# Patient Record
Sex: FEMALE | Race: BLACK OR AFRICAN AMERICAN | URBAN - METROPOLITAN AREA
[De-identification: names, ages, dates, MRNs, and addresses within clinical notes are randomized per-mention and may not be internally consistent; named-entity substitution may affect disease eponyms.]

---

## 2019-10-22 ENCOUNTER — OFFICE VISIT (OUTPATIENT)
Dept: FAMILY MEDICINE CLINIC | Age: 59
End: 2019-10-22

## 2019-10-22 VITALS
HEIGHT: 65 IN | HEART RATE: 98 BPM | BODY MASS INDEX: 23.99 KG/M2 | SYSTOLIC BLOOD PRESSURE: 112 MMHG | OXYGEN SATURATION: 96 % | RESPIRATION RATE: 20 BRPM | DIASTOLIC BLOOD PRESSURE: 71 MMHG | TEMPERATURE: 98.6 F | WEIGHT: 144 LBS

## 2019-10-22 DIAGNOSIS — Z13.6 SCREENING FOR CARDIOVASCULAR CONDITION: ICD-10-CM

## 2019-10-22 DIAGNOSIS — Z11.59 NEED FOR HEPATITIS C SCREENING TEST: ICD-10-CM

## 2019-10-22 DIAGNOSIS — E61.1 IRON DEFICIENCY: ICD-10-CM

## 2019-10-22 DIAGNOSIS — F25.9 SCHIZOAFFECTIVE DISORDER, UNSPECIFIED TYPE (HCC): ICD-10-CM

## 2019-10-22 DIAGNOSIS — J45.41 MODERATE PERSISTENT ASTHMATIC BRONCHITIS WITH ACUTE EXACERBATION: Primary | ICD-10-CM

## 2019-10-22 DIAGNOSIS — M25.562 CHRONIC PAIN OF BOTH KNEES: ICD-10-CM

## 2019-10-22 DIAGNOSIS — G89.29 CHRONIC PAIN OF BOTH KNEES: ICD-10-CM

## 2019-10-22 DIAGNOSIS — M25.561 CHRONIC PAIN OF BOTH KNEES: ICD-10-CM

## 2019-10-22 DIAGNOSIS — Z12.39 SCREENING FOR BREAST CANCER: ICD-10-CM

## 2019-10-22 RX ORDER — CYPROHEPTADINE HYDROCHLORIDE 4 MG/1
TABLET ORAL
COMMUNITY
End: 2022-01-27 | Stop reason: ALTCHOICE

## 2019-10-22 RX ORDER — PREDNISONE 10 MG/1
TABLET ORAL
Qty: 18 TAB | Refills: 0 | Status: SHIPPED | OUTPATIENT
Start: 2019-10-22 | End: 2019-11-20

## 2019-10-22 RX ORDER — GUAIFENESIN AND DEXTROMETHORPHAN HBR 20; 400 MG/1; MG/1
TABLET ORAL
COMMUNITY
End: 2019-10-22 | Stop reason: ALTCHOICE

## 2019-10-22 RX ORDER — PROMETHAZINE HYDROCHLORIDE AND DEXTROMETHORPHAN HYDROBROMIDE 6.25; 15 MG/5ML; MG/5ML
5 SYRUP ORAL
Qty: 240 ML | Refills: 0 | Status: SHIPPED | OUTPATIENT
Start: 2019-10-22 | End: 2019-10-29

## 2019-10-22 RX ORDER — TIMOLOL MALEATE 5 MG/ML
1 SOLUTION/ DROPS OPHTHALMIC 2 TIMES DAILY
COMMUNITY

## 2019-10-22 RX ORDER — RISPERIDONE 4 MG/1
TABLET ORAL
COMMUNITY

## 2019-10-22 RX ORDER — FERROUS SULFATE 324(65)MG
TABLET, DELAYED RELEASE (ENTERIC COATED) ORAL
COMMUNITY
End: 2019-10-23 | Stop reason: SDUPTHER

## 2019-10-22 RX ORDER — DEXTROMETHORPHAN HYDROBROMIDE, GUAIFENESIN 5; 100 MG/5ML; MG/5ML
650 LIQUID ORAL
Qty: 90 TAB | Refills: 0 | Status: SHIPPED | OUTPATIENT
Start: 2019-10-22 | End: 2019-11-12 | Stop reason: SDUPTHER

## 2019-10-22 RX ORDER — ALBUTEROL SULFATE 90 UG/1
2 AEROSOL, METERED RESPIRATORY (INHALATION)
Qty: 1 INHALER | Refills: 2 | Status: SHIPPED | OUTPATIENT
Start: 2019-10-22 | End: 2020-01-27

## 2019-10-22 NOTE — PROGRESS NOTES
HPI  Zafar Robles comes in to establish care. Psychotic disorder: seen by behavioral health specialist and is on risperdal. She has appointment for f/u next month. Will conitnue with current treatment plan. Iron deficiency: she is on iron replacement therapy, will check labs today. Glaucoma: seen by the ophthalmologist. Has h/o glaucoma. Cough: has been coughing for 2 weeks. Initially had fever but this has improved. Cough worse at night. Has wheeze. Cough is non productive. No hemoptysis. Denies chest pain. This is asthmatic bronchitis. Will give albuterol, prednisone and also give promethazine DM for cough. Knee pain: patient has bilateral knee pain due to osteoarthritis. Will give tylenol arthritis to take as needed. Past Medical History  Past Medical History:   Diagnosis Date    Anemia     Psychotic disorder [facility])        Surgical History  Past Surgical History:   Procedure Laterality Date    HX GYN      etopic pregnacy 1970\"s        Medications  Current Outpatient Medications   Medication Sig Dispense Refill    guaiFENesin-dextromethorphan (MUCUS RELIEF DM)  mg tab tablet Take  by mouth.  risperiDONE (RISPERDAL) 4 mg tablet Take  by mouth.  cyproheptadine (PERIACTIN) 4 mg tablet Take  by mouth three (3) times daily as needed.  ferrous sulfate 324 mg (65 mg iron) tablet Take  by mouth Daily (before breakfast).  timolol (TIMOPTIC) 0.5 % ophthalmic solution 1 Drop two (2) times a day.          Allergies  No Known Allergies    Family History  Family History   Problem Relation Age of Onset    Cancer Mother     Diabetes Mother     Hypertension Mother     Cancer Maternal Aunt     Diabetes Maternal Aunt     Hypertension Maternal Aunt     Cancer Maternal Grandmother     Diabetes Maternal Grandmother     Hypertension Maternal Grandmother        Social History  Social History     Socioeconomic History    Marital status: SINGLE     Spouse name: Not on file    Number of children: Not on file    Years of education: Not on file    Highest education level: Not on file   Occupational History    Not on file   Social Needs    Financial resource strain: Not on file    Food insecurity:     Worry: Not on file     Inability: Not on file    Transportation needs:     Medical: Not on file     Non-medical: Not on file   Tobacco Use    Smoking status: Current Every Day Smoker    Smokeless tobacco: Never Used   Substance and Sexual Activity    Alcohol use: Yes    Drug use: Not Currently    Sexual activity: Not Currently   Lifestyle    Physical activity:     Days per week: Not on file     Minutes per session: Not on file    Stress: Not on file   Relationships    Social connections:     Talks on phone: Not on file     Gets together: Not on file     Attends Sabianist service: Not on file     Active member of club or organization: Not on file     Attends meetings of clubs or organizations: Not on file     Relationship status: Not on file    Intimate partner violence:     Fear of current or ex partner: Not on file     Emotionally abused: Not on file     Physically abused: Not on file     Forced sexual activity: Not on file   Other Topics Concern    Not on file   Social History Narrative    Not on file       Review of Systems  Review of Systems - Review of all systems is negative except as noted above in the HPI.     Vital Signs  Visit Vitals  /71 (BP 1 Location: Left arm, BP Patient Position: Sitting)   Pulse 98   Temp 98.6 °F (37 °C) (Oral)   Resp 20   Ht 5' 5\" (1.651 m)   Wt 144 lb (65.3 kg)   SpO2 96%   BMI 23.96 kg/m²         Physical Exam  Physical Examination: General appearance - alert, well appearing, and in no distress, oriented to person, place, and time, normal appearing weight, acyanotic, in no respiratory distress and well hydrated  Mental status - alert, oriented to person, place, and time, affect appropriate to mood  Mouth - mucous membranes moist, pharynx normal without lesions  Neck - supple, no significant adenopathy  Lymphatics - no palpable lymphadenopathy, no hepatosplenomegaly  Chest - clear to auscultation, no wheezes, rales or rhonchi, symmetric air entry  Heart - normal rate, regular rhythm, normal S1, S2, no murmurs, rubs, clicks or gallops  Abdomen - soft, nontender, nondistended, no masses or organomegaly  Back exam - limited range of motion  Neurological - neck supple without rigidity, motor and sensory grossly normal bilaterally  Musculoskeletal -bilateral knee discomfort to palpation along the knee margins, limitation of flexion and extension due to the discomfort, no swelling or erythema. Extremities - intact peripheral pulses    Results  No results found for this or any previous visit. ASSESSMENT and PLAN    ICD-10-CM ICD-9-CM    1. Moderate persistent asthmatic bronchitis with acute exacerbation J45.41 493.92 albuterol (PROVENTIL HFA, VENTOLIN HFA, PROAIR HFA) 90 mcg/actuation inhaler      predniSONE (DELTASONE) 10 mg tablet      promethazine-dextromethorphan (PROMETHAZINE-DM) 6.25-15 mg/5 mL syrup      CBC WITH AUTOMATED DIFF      METABOLIC PANEL, COMPREHENSIVE      METABOLIC PANEL, COMPREHENSIVE      CBC WITH AUTOMATED DIFF   2. Screening for breast cancer Z12.39 V76.10 ALBER MAMMO BI SCREENING INCL CAD      LIPID PANEL      LIPID PANEL   3. Need for hepatitis C screening test Z11.59 V73.89 COLLECTION VENOUS BLOOD,VENIPUNCTURE   4. [facility] unspecified [facility] F25.9 295.70    5. Screening for cardiovascular condition Z13.6 V81.2 HEPATITIS C AB      HEPATITIS C AB      COLLECTION VENOUS BLOOD,VENIPUNCTURE   6. Iron deficiency E61.1 280.9 IRON PROFILE      FERRITIN      FERRITIN      IRON PROFILE      COLLECTION VENOUS BLOOD,VENIPUNCTURE   7.  Chronic pain of both knees M25.561 719.46 acetaminophen (TYLENOL ARTHRITIS PAIN) 650 mg TbER    M25.562 338.29     G89.29       lab results and schedule of future lab studies reviewed with patient  reviewed diet, exercise and weight control  cardiovascular risk and specific lipid/LDL goals reviewed  reviewed medications and side effects in detail    I have discussed the diagnosis with the patient and the intended plan of care as seen in the above orders. The patient has received an after-visit summary and questions were answered concerning future plans. I have discussed medication, side effects, and warnings with the patient in detail. The patient verbalized understanding and is in agreement with the plan of care. The patient will contact the office with any additional concerns. Mark Garcia MD    PLEASE NOTE:   This document has been produced using voice recognition software.  Unrecognized errors in transcription may be present

## 2019-10-23 LAB
A-G RATIO,AGRAT: 1.3 RATIO (ref 1.1–2.6)
ABSOLUTE LYMPHOCYTE COUNT, 10803: 2.9 K/UL (ref 1–4.8)
ALBUMIN SERPL-MCNC: 4.3 G/DL (ref 3.5–5)
ALP SERPL-CCNC: 105 U/L (ref 25–115)
ALT SERPL-CCNC: 15 U/L (ref 5–40)
ANION GAP SERPL CALC-SCNC: 17 MMOL/L
AST SERPL W P-5'-P-CCNC: 20 U/L (ref 10–37)
BASOPHILS # BLD: 0 K/UL (ref 0–0.2)
BASOPHILS NFR BLD: 0 % (ref 0–2)
BILIRUB SERPL-MCNC: 0.1 MG/DL (ref 0.2–1.2)
BUN SERPL-MCNC: 9 MG/DL (ref 6–22)
CALCIUM SERPL-MCNC: 9.7 MG/DL (ref 8.4–10.5)
CHLORIDE SERPL-SCNC: 102 MMOL/L (ref 98–110)
CHOLEST SERPL-MCNC: 189 MG/DL (ref 110–200)
CO2 SERPL-SCNC: 22 MMOL/L (ref 20–32)
CREAT SERPL-MCNC: 0.5 MG/DL (ref 0.5–1.2)
EOSINOPHIL # BLD: 0.2 K/UL (ref 0–0.5)
EOSINOPHIL NFR BLD: 2 % (ref 0–6)
ERYTHROCYTE [DISTWIDTH] IN BLOOD BY AUTOMATED COUNT: 15.8 % (ref 10–15.5)
FE % SATURATION,PSAT: 10 % (ref 20–50)
FERRITIN SERPL-MCNC: 43 NG/ML (ref 10–291)
GFRAA, 66117: >60
GFRNA, 66118: >60
GLOBULIN,GLOB: 3.2 G/DL (ref 2–4)
GLUCOSE SERPL-MCNC: 81 MG/DL (ref 70–99)
GRANULOCYTES,GRANS: 59 % (ref 40–75)
HCT VFR BLD AUTO: 41 % (ref 35.1–48)
HCV AB SER IA-ACNC: NORMAL
HDLC SERPL-MCNC: 2.9 MG/DL (ref 0–5)
HDLC SERPL-MCNC: 65 MG/DL (ref 40–59)
HGB BLD-MCNC: 12.8 G/DL (ref 11.7–16)
IRON,IRN: 27 MCG/DL (ref 30–160)
LDLC SERPL CALC-MCNC: 95 MG/DL (ref 50–99)
LYMPHOCYTES, LYMLT: 27 % (ref 20–45)
MCH RBC QN AUTO: 30 PG (ref 26–34)
MCHC RBC AUTO-ENTMCNC: 31 G/DL (ref 31–36)
MCV RBC AUTO: 97 FL (ref 80–95)
MONOCYTES # BLD: 1.2 K/UL (ref 0.1–1)
MONOCYTES NFR BLD: 12 % (ref 3–12)
NEUTROPHILS # BLD AUTO: 6.2 K/UL (ref 1.8–7.7)
PLATELET # BLD AUTO: 256 K/UL (ref 140–440)
PMV BLD AUTO: 11.6 FL (ref 9–13)
POTASSIUM SERPL-SCNC: 4.5 MMOL/L (ref 3.5–5.5)
PROT SERPL-MCNC: 7.5 G/DL (ref 6.4–8.3)
RBC # BLD AUTO: 4.23 M/UL (ref 3.8–5.2)
SODIUM SERPL-SCNC: 141 MMOL/L (ref 133–145)
TIBC,TIBC: 268 MCG/DL (ref 228–428)
TRIGL SERPL-MCNC: 146 MG/DL (ref 40–149)
UIBC SERPL-MCNC: 241 MCG/DL (ref 110–370)
VLDLC SERPL CALC-MCNC: 29 MG/DL (ref 8–30)
WBC # BLD AUTO: 10.5 K/UL (ref 4–11)

## 2019-10-23 RX ORDER — FERROUS SULFATE 324(65)MG
324 TABLET, DELAYED RELEASE (ENTERIC COATED) ORAL
Qty: 180 TAB | Refills: 1 | Status: SHIPPED | OUTPATIENT
Start: 2019-10-23 | End: 2019-11-20 | Stop reason: SDUPTHER

## 2019-10-23 NOTE — PROGRESS NOTES
Please let patient know her iron level is low. I will recommend that she take the ferrous sulfate 1 pill 2 times a day. I will send in a prescription to reflect that she is taking medication 2 times a day. Rest of her labs are reassuring.   Nicci Rosario MD

## 2019-10-24 NOTE — PROGRESS NOTES
Called patient at this time. No answer noted. Unable to leave a voicemail at this time. Voicemail not set up at this time

## 2019-11-12 DIAGNOSIS — M25.562 CHRONIC PAIN OF BOTH KNEES: ICD-10-CM

## 2019-11-12 DIAGNOSIS — G89.29 CHRONIC PAIN OF BOTH KNEES: ICD-10-CM

## 2019-11-12 DIAGNOSIS — M25.561 CHRONIC PAIN OF BOTH KNEES: ICD-10-CM

## 2019-11-13 RX ORDER — GUAIFENESIN 100 MG/5ML
LIQUID (ML) ORAL
Qty: 90 TAB | Refills: 0 | Status: SHIPPED | OUTPATIENT
Start: 2019-11-13 | End: 2020-05-21 | Stop reason: SDUPTHER

## 2019-11-20 ENCOUNTER — OFFICE VISIT (OUTPATIENT)
Dept: FAMILY MEDICINE CLINIC | Age: 59
End: 2019-11-20

## 2019-11-20 VITALS
WEIGHT: 146 LBS | BODY MASS INDEX: 24.32 KG/M2 | HEART RATE: 83 BPM | DIASTOLIC BLOOD PRESSURE: 61 MMHG | OXYGEN SATURATION: 97 % | RESPIRATION RATE: 16 BRPM | TEMPERATURE: 98.3 F | SYSTOLIC BLOOD PRESSURE: 107 MMHG | HEIGHT: 65 IN

## 2019-11-20 DIAGNOSIS — E61.1 IRON DEFICIENCY: ICD-10-CM

## 2019-11-20 DIAGNOSIS — M25.50 ARTHRALGIA, UNSPECIFIED JOINT: Primary | ICD-10-CM

## 2019-11-20 RX ORDER — IBUPROFEN 600 MG/1
600 TABLET ORAL
Qty: 60 TAB | Refills: 0 | Status: SHIPPED | OUTPATIENT
Start: 2019-11-20 | End: 2020-07-22

## 2019-11-20 RX ORDER — FERROUS SULFATE 324(65)MG
324 TABLET, DELAYED RELEASE (ENTERIC COATED) ORAL
Qty: 180 TAB | Refills: 1 | Status: SHIPPED | OUTPATIENT
Start: 2019-11-20 | End: 2021-03-15

## 2019-11-20 NOTE — PROGRESS NOTES
Chief Complaint   Patient presents with    Follow-up    Leg Pain     pain in the back of knee request pain medication      1. Have you been to the ER, urgent care clinic since your last visit? Hospitalized since your last visit? No    2. Have you seen or consulted any other health care providers outside of the [address] [place] [address] since your last visit? Include any pap smears or colon screening. No     HPI  Yonatan Chakraborty comes in for f/u care. Patient has a history of iron deficiency. We did check her iron levels last time she was seen and this was slightly low. I will have her take iron tablets. Recheck labs in 3 months. She has arthralgia with left leg and left elbow pain. Pain comes on and off. It comes with activity, lifting or walking. Patient has taken Tylenol arthritis. She wonders whether she can be given Tylenol with codeine. Discussed medication management for arthralgia. Will send in ibuprofen 600 mg to take up to every 8 hours as needed for the pain. I will follow-up in case of no improvement or worsening symptoms. Patient had a colonoscopy done. We will request the report. She will schedule visit to come in for Pap smear. Past Medical History  Past Medical History:   Diagnosis Date    Anemia     Psychotic disorder ([facility]        Surgical History  Past Surgical History:   Procedure Laterality Date    HX GYN      etopic pregnacy 1970\"s        Medications  Current Outpatient Medications   Medication Sig Dispense Refill    ARTHRITIS PAIN RELIEF 650 mg TbER take 1 tablet by mouth three times a day if needed for pain 90 Tab 0    risperiDONE (RISPERDAL) 4 mg tablet Take  by mouth.  cyproheptadine (PERIACTIN) 4 mg tablet Take  by mouth three (3) times daily as needed.  timolol (TIMOPTIC) 0.5 % ophthalmic solution 1 Drop two (2) times a day.       albuterol (PROVENTIL HFA, VENTOLIN HFA, PROAIR HFA) 90 mcg/actuation inhaler Take 2 Puffs by inhalation every six (6) hours as needed for Wheezing. 1 Inhaler 2    ferrous sulfate 324 mg (65 mg iron) tablet Take 1 Tab by mouth Daily (before breakfast). 180 Tab 1       Allergies  No Known Allergies    Family History  Family History   Problem Relation Age of Onset    Cancer Mother     Diabetes Mother     Hypertension Mother     Cancer Maternal Aunt     Diabetes Maternal Aunt     Hypertension Maternal Aunt     Cancer Maternal Grandmother     Diabetes Maternal Grandmother     Hypertension Maternal Grandmother        Social History  Social History     Socioeconomic History    Marital status: SINGLE     Spouse name: Not on file    Number of children: Not on file    Years of education: Not on file    Highest education level: Not on file   Occupational History    Not on file   Social Needs    Financial resource strain: Not on file    Food insecurity:     Worry: Not on file     Inability: Not on file    Transportation needs:     Medical: Not on file     Non-medical: Not on file   Tobacco Use    Smoking status: Current Every Day Smoker    Smokeless tobacco: Never Used   Substance and Sexual Activity    Alcohol use:  Yes    Drug use: Not Currently    Sexual activity: Not Currently   Lifestyle    Physical activity:     Days per week: Not on file     Minutes per session: Not on file    Stress: Not on file   Relationships    Social connections:     Talks on phone: Not on file     Gets together: Not on file     Attends Protestant service: Not on file     Active member of club or organization: Not on file     Attends meetings of clubs or organizations: Not on file     Relationship status: Not on file    Intimate partner violence:     Fear of current or ex partner: Not on file     Emotionally abused: Not on file     Physically abused: Not on file     Forced sexual activity: Not on file   Other Topics Concern    Not on file   Social History Narrative    Not on file       Review of Systems  Review of Systems - Review of all systems is negative except as noted above in the HPI. Vital Signs  Visit Vitals  /61 (BP 1 Location: Left arm, BP Patient Position: Sitting)   Pulse 83   Temp 98.3 °F (36.8 °C) (Oral)   Resp 16   Ht 5' 5\" (1.651 m)   Wt 146 lb (66.2 kg)   SpO2 97%   BMI 24.30 kg/m²         Physical Exam  Physical Examination: General appearance - acyanotic, in no respiratory distress  Mental status - affect appropriate to mood  Lymphatics - no palpable lymphadenopathy  Chest - no tachypnea, retractions or cyanosis  Heart - S1 and S2 normal  Back exam - limited range of motion  Neurological - abnormal neurological exam unchanged from prior examinations  Musculoskeletal - osteoarthritic changes noted in both hands  Extremities - intact peripheral pulses    Results  Results for orders placed or performed in visit on 10/22/19   FERRITIN   Result Value Ref Range    Ferritin 43 10 - 291 ng/mL   IRON PROFILE   Result Value Ref Range    Iron 27 (L) 30 - 160 mcg/dL    UIBC 241 110 - 370 mcg/dL    TIBC 268 228 - 428 mcg/dL    Iron % saturation 10 (L) 20 - 50 %   HEPATITIS C AB   Result Value Ref Range    Hep C Virus Ab None Detected None Detec   LIPID PANEL   Result Value Ref Range    Triglyceride 146 40 - 149 mg/dL    HDL Cholesterol 65 (H) 40 - 59 mg/dL    Cholesterol, total 189 110 - 200 mg/dL    CHOLESTEROL/HDL 2.9 0.0 - 5.0    LDL, calculated 95 50 - 99 mg/dL    VLDL, calculated 29 8 - 30 mg/dL   METABOLIC PANEL, COMPREHENSIVE   Result Value Ref Range    Glucose 81 70 - 99 mg/dL    BUN 9 6 - 22 mg/dL    Creatinine 0.5 0.5 - 1.2 mg/dL    Sodium 141 133 - 145 mmol/L    Potassium 4.5 3.5 - 5.5 mmol/L    Chloride 102 98 - 110 mmol/L    CO2 22 20 - 32 mmol/L    AST (SGOT) 20 10 - 37 U/L    ALT (SGPT) 15 5 - 40 U/L    Alk.  phosphatase 105 25 - 115 U/L    Bilirubin, total 0.1 (L) 0.2 - 1.2 mg/dL    Calcium 9.7 8.4 - 10.5 mg/dL    Protein, total 7.5 6.4 - 8.3 g/dL    Albumin 4.3 3.5 - 5.0 g/dL    A-G Ratio 1.3 1.1 - 2.6 ratio    Globulin 3.2 2.0 - 4.0 g/dL    Anion gap 17.0 mmol/L    GFRAA >60.0 >60.0    GFRNA >60.0 >60.0   CBC WITH AUTOMATED DIFF   Result Value Ref Range    WBC 10.5 4.0 - 11.0 K/uL    RBC 4.23 3.80 - 5.20 M/uL    HGB 12.8 11.7 - 16.0 g/dL    HCT 41.0 35.1 - 48.0 %    MCV 97 (H) 80 - 95 fL    MCH 30 26 - 34 pg    MCHC 31 31 - 36 g/dL    RDW 15.8 (H) 10.0 - 15.5 %    PLATELET 673 655 - 458 K/uL    MPV 11.6 9.0 - 13.0 fL    NEUTROPHILS 59 40 - 75 %    Lymphocytes 27 20 - 45 %    MONOCYTES 12 3 - 12 %    EOSINOPHILS 2 0 - 6 %    BASOPHILS 0 0 - 2 %    ABS. NEUTROPHILS 6.2 1.8 - 7.7 K/uL    ABSOLUTE LYMPHOCYTE COUNT 2.9 1.0 - 4.8 K/uL    ABS. MONOCYTES 1.2 (H) 0.1 - 1.0 K/uL    ABS. EOSINOPHILS 0.2 0.0 - 0.5 K/uL    ABS. BASOPHILS 0.0 0.0 - 0.2 K/uL       ASSESSMENT and PLAN    ICD-10-CM ICD-9-CM    1. Arthralgia, unspecified joint M25.50 719.40 ibuprofen (MOTRIN) 600 mg tablet   2. Iron deficiency E61.1 280.9      lab results and schedule of future lab studies reviewed with patient  reviewed diet, exercise and weight control  reviewed medications and side effects in detail    I have discussed the diagnosis with the patient and the intended plan of care as seen in the above orders. The patient has received an after-visit summary and questions were answered concerning future plans. I have discussed medication, side effects, and warnings with the patient in detail. The patient verbalized understanding and is in agreement with the plan of care. The patient will contact the office with any additional concerns. Vijaya Velazquez MD    PLEASE NOTE:   This document has been produced using voice recognition software.  Unrecognized errors in transcription may be present

## 2019-12-13 DIAGNOSIS — Z12.39 SCREENING FOR BREAST CANCER: ICD-10-CM

## 2020-01-22 ENCOUNTER — OFFICE VISIT (OUTPATIENT)
Dept: FAMILY MEDICINE CLINIC | Age: 60
End: 2020-01-22

## 2020-01-22 VITALS
WEIGHT: 146 LBS | RESPIRATION RATE: 16 BRPM | TEMPERATURE: 96.3 F | HEIGHT: 65 IN | OXYGEN SATURATION: 97 % | HEART RATE: 90 BPM | DIASTOLIC BLOOD PRESSURE: 69 MMHG | BODY MASS INDEX: 24.32 KG/M2 | SYSTOLIC BLOOD PRESSURE: 138 MMHG

## 2020-01-22 DIAGNOSIS — Z12.11 SCREEN FOR COLON CANCER: ICD-10-CM

## 2020-01-22 DIAGNOSIS — E61.1 IRON DEFICIENCY: ICD-10-CM

## 2020-01-22 DIAGNOSIS — R05.9 COUGH: ICD-10-CM

## 2020-01-22 DIAGNOSIS — Z12.4 SCREENING FOR MALIGNANT NEOPLASM OF CERVIX: Primary | ICD-10-CM

## 2020-01-22 RX ORDER — GUAIFENESIN DEXTROMETHORPHAN HYDROBROMIDE ORAL SOLUTION 10; 100 MG/5ML; MG/5ML
10 SOLUTION ORAL
Qty: 236 ML | Refills: 0 | Status: SHIPPED | OUTPATIENT
Start: 2020-01-22 | End: 2020-05-21 | Stop reason: SDUPTHER

## 2020-01-22 NOTE — PROGRESS NOTES
Chief Complaint   Patient presents with    Well Woman     1. Have you been to the ER, urgent care clinic since your last visit? Hospitalized since your last visit? No    2. Have you seen or consulted any other health care providers outside of the [address] [place] [address] since your last visit? Include any pap smears or colon screening. No     HPI  Denia Barksdale comes in accompanied by her caregiver. She is due to have Pap smear done today. Cough: Patient has an occasional cough on and off. It has been ongoing for about a week. No fever, chills, shortness of breath, wheeze or chest pain. She would like to try some cough medication. I will send in [facility]. I will follow-up in case of no improvement worsening symptoms. Past Medical History  Past Medical History:   Diagnosis Date    Anemia     Psychotic disorder [facility])        Surgical History  Past Surgical History:   Procedure Laterality Date    HX GYN      etopic pregnacy 1970\"s        Medications  Current Outpatient Medications   Medication Sig Dispense Refill    ferrous sulfate 324 mg (65 mg iron) tablet Take 1 Tab by mouth Daily (before breakfast). 180 Tab 1    ibuprofen (MOTRIN) 600 mg tablet Take 1 Tab by mouth every eight (8) hours as needed for Pain. 60 Tab 0    ARTHRITIS PAIN RELIEF 650 mg TbER take 1 tablet by mouth three times a day if needed for pain 90 Tab 0    risperiDONE (RISPERDAL) 4 mg tablet Take  by mouth.  cyproheptadine (PERIACTIN) 4 mg tablet Take  by mouth three (3) times daily as needed.  timolol (TIMOPTIC) 0.5 % ophthalmic solution 1 Drop two (2) times a day.  albuterol (PROVENTIL HFA, VENTOLIN HFA, PROAIR HFA) 90 mcg/actuation inhaler Take 2 Puffs by inhalation every six (6) hours as needed for Wheezing.  1 Inhaler 2       Allergies  No Known Allergies    Family History  Family History   Problem Relation Age of Onset    Cancer Mother     Diabetes Mother     Hypertension Mother     Cancer Maternal Aunt     Diabetes Maternal Aunt     Hypertension Maternal Aunt     Cancer Maternal Grandmother     Diabetes Maternal Grandmother     Hypertension Maternal Grandmother        Social History  Social History     Socioeconomic History    Marital status: SINGLE     Spouse name: Not on file    Number of children: Not on file    Years of education: Not on file    Highest education level: Not on file   Occupational History    Not on file   Social Needs    Financial resource strain: Not on file    Food insecurity:     Worry: Not on file     Inability: Not on file    Transportation needs:     Medical: Not on file     Non-medical: Not on file   Tobacco Use    Smoking status: Current Every Day Smoker    Smokeless tobacco: Never Used   Substance and Sexual Activity    Alcohol use: Yes    Drug use: Not Currently    Sexual activity: Not Currently   Lifestyle    Physical activity:     Days per week: Not on file     Minutes per session: Not on file    Stress: Not on file   Relationships    Social connections:     Talks on phone: Not on file     Gets together: Not on file     Attends Bahai service: Not on file     Active member of club or organization: Not on file     Attends meetings of clubs or organizations: Not on file     Relationship status: Not on file    Intimate partner violence:     Fear of current or ex partner: Not on file     Emotionally abused: Not on file     Physically abused: Not on file     Forced sexual activity: Not on file   Other Topics Concern    Not on file   Social History Narrative    Not on file       Review of Systems  Review of Systems - Review of all systems is negative except as noted above in the HPI.     Vital Signs  Visit Vitals  /69 (BP 1 Location: Left arm, BP Patient Position: Sitting)   Pulse 90   Temp 96.3 °F (35.7 °C) (Oral)   Resp 16   Ht 5' 5\" (1.651 m)   Wt 146 lb (66.2 kg)   SpO2 97%   BMI 24.30 kg/m²         Physical Exam  Physical Examination: General appearance - alert, well appearing, and in no distress, oriented to person, place, and time and acyanotic, in no respiratory distress  Mental status - alert, oriented to person, place, and time, affect appropriate to mood  Neck - supple, no significant adenopathy  Lymphatics - no palpable lymphadenopathy  Chest - clear to auscultation, no wheezes, rales or rhonchi, symmetric air entry  Heart - normal rate and regular rhythm, S1 and S2 normal  Abdomen - soft, nontender, nondistended, no masses or organomegaly  Pelvic - normal external genitalia, vulva, vagina, cervix, uterus and adnexa, PAP: Pap smear done today, exam chaperoned by Sandra Lugo LPN. Neurological - alert, oriented, normal speech, no focal findings or movement disorder noted  Musculoskeletal - osteoarthritic changes noted in both hands  Extremities - intact peripheral pulses    Results  Results for orders placed or performed in visit on 10/22/19   FERRITIN   Result Value Ref Range    Ferritin 43 10 - 291 ng/mL   IRON PROFILE   Result Value Ref Range    Iron 27 (L) 30 - 160 mcg/dL    UIBC 241 110 - 370 mcg/dL    TIBC 268 228 - 428 mcg/dL    Iron % saturation 10 (L) 20 - 50 %   HEPATITIS C AB   Result Value Ref Range    Hep C Virus Ab None Detected None Detec   LIPID PANEL   Result Value Ref Range    Triglyceride 146 40 - 149 mg/dL    HDL Cholesterol 65 (H) 40 - 59 mg/dL    Cholesterol, total 189 110 - 200 mg/dL    CHOLESTEROL/HDL 2.9 0.0 - 5.0    LDL, calculated 95 50 - 99 mg/dL    VLDL, calculated 29 8 - 30 mg/dL   METABOLIC PANEL, COMPREHENSIVE   Result Value Ref Range    Glucose 81 70 - 99 mg/dL    BUN 9 6 - 22 mg/dL    Creatinine 0.5 0.5 - 1.2 mg/dL    Sodium 141 133 - 145 mmol/L    Potassium 4.5 3.5 - 5.5 mmol/L    Chloride 102 98 - 110 mmol/L    CO2 22 20 - 32 mmol/L    AST (SGOT) 20 10 - 37 U/L    ALT (SGPT) 15 5 - 40 U/L    Alk.  phosphatase 105 25 - 115 U/L    Bilirubin, total 0.1 (L) 0.2 - 1.2 mg/dL    Calcium 9.7 8.4 - 10.5 mg/dL    Protein, total 7.5 6.4 - 8.3 g/dL    Albumin 4.3 3.5 - 5.0 g/dL    A-G Ratio 1.3 1.1 - 2.6 ratio    Globulin 3.2 2.0 - 4.0 g/dL    Anion gap 17.0 mmol/L    GFRAA >60.0 >60.0    GFRNA >60.0 >60.0   CBC WITH AUTOMATED DIFF   Result Value Ref Range    WBC 10.5 4.0 - 11.0 K/uL    RBC 4.23 3.80 - 5.20 M/uL    HGB 12.8 11.7 - 16.0 g/dL    HCT 41.0 35.1 - 48.0 %    MCV 97 (H) 80 - 95 fL    MCH 30 26 - 34 pg    MCHC 31 31 - 36 g/dL    RDW 15.8 (H) 10.0 - 15.5 %    PLATELET 298 932 - 743 K/uL    MPV 11.6 9.0 - 13.0 fL    NEUTROPHILS 59 40 - 75 %    Lymphocytes 27 20 - 45 %    MONOCYTES 12 3 - 12 %    EOSINOPHILS 2 0 - 6 %    BASOPHILS 0 0 - 2 %    ABS. NEUTROPHILS 6.2 1.8 - 7.7 K/uL    ABSOLUTE LYMPHOCYTE COUNT 2.9 1.0 - 4.8 K/uL    ABS. MONOCYTES 1.2 (H) 0.1 - 1.0 K/uL    ABS. EOSINOPHILS 0.2 0.0 - 0.5 K/uL    ABS. BASOPHILS 0.0 0.0 - 0.2 K/uL       ASSESSMENT and PLAN    ICD-10-CM ICD-9-CM    1. Screening for malignant neoplasm of cervix Z12.4 V76.2 PAP IG, CT-NG-TV, APTIMA HPV AND RFX 97/90,56([id number])      PAP IG, CT-NG-TV, APTIMA HPV AND RFX 60/53,30([id number])      COLLECTION VENOUS BLOOD,VENIPUNCTURE   2. Cough R05 786.2 guaiFENesin-dextromethorphan (GUAIFENESIN-DM)  mg/5 mL liqd   3. Screen for colon cancer Z12.11 V76.51 OCCULT BLOOD IMMUNOASSAY,DIAGNOSTIC   4. Iron deficiency E61.1 280.9 IRON PROFILE      FERRITIN      CBC W/O DIFF      CBC W/O DIFF      FERRITIN      IRON PROFILE      COLLECTION VENOUS BLOOD,VENIPUNCTURE     lab results and schedule of future lab studies reviewed with patient  reviewed diet, exercise and weight control  cardiovascular risk and specific lipid/LDL goals reviewed  reviewed medications and side effects in detail      I have discussed the diagnosis with the patient and the intended plan of care as seen in the above orders. The patient has received an after-visit summary and questions were answered concerning future plans.  I have discussed medication, side effects, and warnings with the patient in detail. The patient verbalized understanding and is in agreement with the plan of care. The patient will contact the office with any additional concerns. Bard Nageotte, MD    PLEASE NOTE:   This document has been produced using voice recognition software.  Unrecognized errors in transcription may be present

## 2020-01-23 DIAGNOSIS — J45.41 MODERATE PERSISTENT ASTHMATIC BRONCHITIS WITH ACUTE EXACERBATION: ICD-10-CM

## 2020-01-23 LAB
ERYTHROCYTE [DISTWIDTH] IN BLOOD BY AUTOMATED COUNT: 16.6 % (ref 10–15.5)
FE % SATURATION,PSAT: 14 % (ref 20–50)
FERRITIN SERPL-MCNC: 28 NG/ML (ref 10–291)
HCT VFR BLD AUTO: 40 % (ref 35.1–48)
HGB BLD-MCNC: 12.5 G/DL (ref 11.7–16)
IRON,IRN: 47 MCG/DL (ref 30–160)
MCH RBC QN AUTO: 31 PG (ref 26–34)
MCHC RBC AUTO-ENTMCNC: 31 G/DL (ref 31–36)
MCV RBC AUTO: 98 FL (ref 81–99)
PLATELET # BLD AUTO: 260 K/UL (ref 140–440)
PMV BLD AUTO: 11.6 FL (ref 9–13)
RBC # BLD AUTO: 4.1 M/UL (ref 3.8–5.2)
TIBC,TIBC: 339 MCG/DL (ref 228–428)
UIBC SERPL-MCNC: 292 MCG/DL (ref 110–370)
WBC # BLD AUTO: 7.7 K/UL (ref 4–11)

## 2020-01-24 LAB
CHLAMYDIA TRACHOMATIS THINPREP, 13342: NEGATIVE
HPV H RFLX, 13184: NEGATIVE
NEISSERIA GONORRHOEAE THINPREP, 13343: NEGATIVE
PAP IMAGE GUIDED, 8900296: NORMAL
TRICHOMONAS NUC AMP-THIN PREP,13357: POSITIVE

## 2020-01-27 RX ORDER — ALBUTEROL SULFATE 90 UG/1
AEROSOL, METERED RESPIRATORY (INHALATION)
Qty: 8.5 G | Refills: 1 | Status: SHIPPED | OUTPATIENT
Start: 2020-01-27 | End: 2020-05-21 | Stop reason: SDUPTHER

## 2020-02-06 RX ORDER — METRONIDAZOLE 500 MG/1
500 TABLET ORAL 3 TIMES DAILY
Qty: 30 TAB | Refills: 0 | Status: SHIPPED | OUTPATIENT
Start: 2020-02-06 | End: 2020-02-16

## 2020-02-06 NOTE — PROGRESS NOTES
Patient was advised of pap smear and Trichomonas test results, and that a prescription had been sent to the pharmacy.   Patient verbalized understanding

## 2020-02-06 NOTE — PROGRESS NOTES
Please let patient know the pap smear test result is negative for malignancy and recheck in 3 - 5 years. Trichomonas test is positive. This needs to be treated. I will send in metronidazole to take 3 x daily.   Kiet Mckay MD

## 2020-05-21 ENCOUNTER — VIRTUAL VISIT (OUTPATIENT)
Dept: FAMILY MEDICINE CLINIC | Age: 60
End: 2020-05-21

## 2020-05-21 DIAGNOSIS — M25.561 CHRONIC PAIN OF BOTH KNEES: ICD-10-CM

## 2020-05-21 DIAGNOSIS — R05.9 COUGH: Primary | ICD-10-CM

## 2020-05-21 DIAGNOSIS — J45.41 MODERATE PERSISTENT ASTHMATIC BRONCHITIS WITH ACUTE EXACERBATION: ICD-10-CM

## 2020-05-21 DIAGNOSIS — M25.562 CHRONIC PAIN OF BOTH KNEES: ICD-10-CM

## 2020-05-21 DIAGNOSIS — G89.29 CHRONIC PAIN OF BOTH KNEES: ICD-10-CM

## 2020-05-21 RX ORDER — DEXTROMETHORPHAN HYDROBROMIDE, GUAIFENESIN 5; 100 MG/5ML; MG/5ML
650 LIQUID ORAL
Qty: 90 TAB | Refills: 0 | Status: SHIPPED | OUTPATIENT
Start: 2020-05-21 | End: 2021-05-26 | Stop reason: SDUPTHER

## 2020-05-21 RX ORDER — ALBUTEROL SULFATE 90 UG/1
AEROSOL, METERED RESPIRATORY (INHALATION)
Qty: 8.5 G | Refills: 1 | Status: SHIPPED | OUTPATIENT
Start: 2020-05-21 | End: 2020-07-22 | Stop reason: SDUPTHER

## 2020-05-21 RX ORDER — GUAIFENESIN DEXTROMETHORPHAN HYDROBROMIDE ORAL SOLUTION 10; 100 MG/5ML; MG/5ML
10 SOLUTION ORAL
Qty: 236 ML | Refills: 0 | Status: SHIPPED | OUTPATIENT
Start: 2020-05-21 | End: 2020-07-22

## 2020-05-21 NOTE — PROGRESS NOTES
Teddy Ordonez is a 60 y.o. female evaluated via audio only technology on 5/21/2020. Consent: She and/or her health care decision maker is aware that she may receive a bill for this audio only encounter, depending on her insurance coverage, and has provided verbal consent to proceed: Yes    I communicated with the patient and/or health care decision maker about the nature and details of the following:  Assessment & Plan:       ICD-10-CM ICD-9-CM    1. Cough R05 786.2 guaiFENesin-dextromethorphan (guaiFENesin-DM)  mg/5 mL liqd   2. Moderate persistent asthmatic bronchitis with acute exacerbation J45.41 493.92 albuterol (ProAir HFA) 90 mcg/actuation inhaler   3. Chronic pain of both knees M25.561 719.46 acetaminophen (Arthritis Pain Relief) 650 mg TbER    M25.562 338.29     G89.29     12  Subjective:   Teddy Ordonez is a 60 y.o. female who was seen for Knee Pain and Cough    Patient has a history of iron deficiency. She is on iron replacement therapy. We did labs previously and her ferritin and iron levels were stable. We will continue with the current management plan. Patient has a cough that comes on and off. It is a dry irritating cough. No wheeze or shortness of breath. She does have albuterol inhaler to use as needed for wheezing. She denies hemoptysis or chest pain. No fever, chills, night sweats. She would like some cough medication sent into the pharmacy. I will send in guaifenesin DM. Patient has arthritis. She gets joint aches lower extremities. This affects the knees and the feet. Has used Tylenol arthritis in the past with good result. I will send in a refill of medication. Patient has bipolar disorder and is on Risperdal.  She will continue with this medication. I will follow-up with her in the clinic at next visit. Prior to Admission medications    Medication Sig Start Date End Date Taking?  Authorizing Provider   PROAIR HFA 90 mcg/actuation inhaler inhale 2 puffs by mouth every 6 hours if needed for wheezing 1/27/20   Alexis Beltran MD   guaiFENesin-dextromethorphan (GUAIFENESIN-DM)  mg/5 mL liqd Take 10 mL by mouth every six (6) hours as needed for Cough. 1/22/20   Alexis Beltran MD   ferrous sulfate 324 mg (65 mg iron) tablet Take 1 Tab by mouth Daily (before breakfast). 11/20/19   Alexis Beltran MD   ibuprofen (MOTRIN) 600 mg tablet Take 1 Tab by mouth every eight (8) hours as needed for Pain. 11/20/19   Alexis Beltran MD   ARTHRITIS PAIN RELIEF 650 mg TbER take 1 tablet by mouth three times a day if needed for pain 11/13/19   Alexis Beltran MD   risperiDONE (RISPERDAL) 4 mg tablet Take  by mouth. Provider, Historical   cyproheptadine (PERIACTIN) 4 mg tablet Take  by mouth three (3) times daily as needed. Provider, Historical   timolol (TIMOPTIC) 0.5 % ophthalmic solution 1 Drop two (2) times a day. Provider, Historical     No Known Allergies    ROS Review of all systems is negative except as noted above in the HPI. I affirm this is a Patient-Initiated Episode with a Patient who has not had a related appointment within my department in the past 7 days or scheduled within the next 24 hours.     Total Time: minutes: 11-20 minutes    Note: not billable if this call serves to triage the patient into an appointment for the relevant concern      Gino Humphrey MD

## 2020-07-22 ENCOUNTER — OFFICE VISIT (OUTPATIENT)
Dept: FAMILY MEDICINE CLINIC | Age: 60
End: 2020-07-22

## 2020-07-22 VITALS
WEIGHT: 131 LBS | RESPIRATION RATE: 20 BRPM | HEART RATE: 88 BPM | TEMPERATURE: 99 F | OXYGEN SATURATION: 99 % | HEIGHT: 64 IN | DIASTOLIC BLOOD PRESSURE: 61 MMHG | BODY MASS INDEX: 22.36 KG/M2 | SYSTOLIC BLOOD PRESSURE: 103 MMHG

## 2020-07-22 DIAGNOSIS — F25.9 SCHIZOAFFECTIVE DISORDER, UNSPECIFIED TYPE (HCC): ICD-10-CM

## 2020-07-22 DIAGNOSIS — Z72.0 TOBACCO ABUSE DISORDER: ICD-10-CM

## 2020-07-22 DIAGNOSIS — R05.3 CHRONIC COUGH: Primary | ICD-10-CM

## 2020-07-22 DIAGNOSIS — E61.1 IRON DEFICIENCY: ICD-10-CM

## 2020-07-22 DIAGNOSIS — J45.41 MODERATE PERSISTENT ASTHMATIC BRONCHITIS WITH ACUTE EXACERBATION: ICD-10-CM

## 2020-07-22 RX ORDER — ALBUTEROL SULFATE 90 UG/1
AEROSOL, METERED RESPIRATORY (INHALATION)
Qty: 8.5 G | Refills: 1 | Status: SHIPPED | OUTPATIENT
Start: 2020-07-22 | End: 2021-05-26 | Stop reason: SDUPTHER

## 2020-07-22 NOTE — PROGRESS NOTES
HPI  Clara Mack comes in for f/u care. Chronic cough: Patient has a cough that has been present for months. Cough comes on and off. It is nonproductive. She does have a history of asthma. Denies chest pain or hemoptysis but does have wheeze. She denies shortness of breath. I will do a chest x-ray given the chronicity of the cough. Asthma: Patient has a history of asthma. She is out of her inhaler medication and I will send in a refill of this. As noted above she continues to have a cough and wheeze. This could be related to her asthma. Tobacco abuse disorder: Patient continues to smoke. Has been advised against this habit. She is not ready to quit smoking. Tobacco cessation counseling was done. The dangers of smoking were elaborated to the patient. She does understand this. She knows that there is medication that can help quit smoking and other options like nicotine replacement therapy are available to her. Psychotic disorder: Patient has psychotic disorder. She is followed up by behavioral health specialist.  She is on Risperdal.  Has been stable on medication. Anemia: Patient has a history of anemia. She takes ferrous sulfate. Plan to recheck labs at next visit. Past Medical History  Past Medical History:   Diagnosis Date    Anemia     Psychotic disorder ([facility] [address]        Surgical History  Past Surgical History:   Procedure Laterality Date    HX GYN      etopic pregnacy 1970\"s        Medications  Current Outpatient Medications   Medication Sig Dispense Refill    acetaminophen (Arthritis Pain Relief) 650 mg TbER Take 1 Tab by mouth every eight (8) hours as needed for Pain. 90 Tab 0    albuterol (ProAir HFA) 90 mcg/actuation inhaler inhale 2 puffs by mouth every 6 hours if needed for wheezing 8.5 g 1    ferrous sulfate 324 mg (65 mg iron) tablet Take 1 Tab by mouth Daily (before breakfast). 180 Tab 1    risperiDONE (RISPERDAL) 4 mg tablet Take  by mouth.       cyproheptadine (PERIACTIN) 4 mg tablet Take  by mouth three (3) times daily as needed.  timolol (TIMOPTIC) 0.5 % ophthalmic solution 1 Drop two (2) times a day.  guaiFENesin-dextromethorphan (guaiFENesin-DM)  mg/5 mL liqd Take 10 mL by mouth every six (6) hours as needed for Cough. 236 mL 0    ibuprofen (MOTRIN) 600 mg tablet Take 1 Tab by mouth every eight (8) hours as needed for Pain.  61 Tab 0       Allergies  No Known Allergies    Family History  Family History   Problem Relation Age of Onset    Cancer Mother     Diabetes Mother     Hypertension Mother     Cancer Maternal Aunt     Diabetes Maternal Aunt     Hypertension Maternal Aunt     Cancer Maternal Grandmother     Diabetes Maternal Grandmother     Hypertension Maternal Grandmother        Social History  Social History     Socioeconomic History    Marital status: SINGLE     Spouse name: Not on file    Number of children: Not on file    Years of education: Not on file    Highest education level: Not on file   Occupational History    Not on file   Social Needs    Financial resource strain: Not on file    Food insecurity     Worry: Not on file     Inability: Not on file    Transportation needs     Medical: Not on file     Non-medical: Not on file   Tobacco Use    Smoking status: Current Every Day Smoker    Smokeless tobacco: Never Used   Substance and Sexual Activity    Alcohol use: Yes     Comment: occasionally    Drug use: Not Currently    Sexual activity: Not Currently   Lifestyle    Physical activity     Days per week: Not on file     Minutes per session: Not on file    Stress: Not on file   Relationships    Social connections     Talks on phone: Not on file     Gets together: Not on file     Attends Mandaeism service: Not on file     Active member of club or organization: Not on file     Attends meetings of clubs or organizations: Not on file     Relationship status: Not on file    Intimate partner violence     Fear of current or ex partner: Not on file     Emotionally abused: Not on file     Physically abused: Not on file     Forced sexual activity: Not on file   Other Topics Concern    Not on file   Social History Narrative    Not on file       Review of Systems  Review of Systems - Review of all systems is negative except as noted above in the HPI. Vital Signs  Visit Vitals  /61 (BP 1 Location: Left arm, BP Patient Position: Sitting)   Pulse 88   Temp 99 °F (37.2 °C) (Oral)   Resp 20   Ht 5' 4\" (1.626 m)   Wt 131 lb (59.4 kg)   SpO2 99%   BMI 22.49 kg/m²         Physical Exam  Physical Examination: General appearance - oriented to person, place, and time and acyanotic, in no respiratory distress  Mental status - affect appropriate to mood  Lymphatics - no palpable lymphadenopathy, no hepatosplenomegaly  Chest - wheezing noted mid lung zones bilaterally, decreased air entry noted bilateral lung bases  Heart - S1 and S2 normal  Abdomen - no rebound tenderness noted  Neurological - motor and sensory grossly normal bilaterally  Musculoskeletal - no muscular tenderness noted  Extremities - no pedal edema noted, intact peripheral pulses      Results  Results for orders placed or performed in visit on 01/22/20   CHLAM/GC/TRICH NUCL AMP THIN PREP   Result Value Ref Range    CHLAMYDIA TRACHOMATIS THINPREP Negative Negative    NEISSERIA GONORRHOEAE THINPREP Negative Negative    TRICHOMONAS NUC AMP-THIN PREP POSITIVE (A) Negative   HPV RFX 16&18   Result Value Ref Range    HPV H RFLX Negative Negative   PAP IG (IMAGE GUIDED)   Result Value Ref Range    PAP IMAGE GUIDED         ASSESSMENT and PLAN    ICD-10-CM ICD-9-CM    1. Chronic cough  R05 786.2 XR CHEST PA LAT   2. Moderate persistent asthmatic bronchitis with acute exacerbation  J45.41 493.92 albuterol (ProAir HFA) 90 mcg/actuation inhaler      XR CHEST PA LAT   3. Iron deficiency  E61.1 280.9    4. [facility]  F25.9 295.70    5.  Tobacco abuse disorder Z72.0 305.1      lab results and schedule of future lab studies reviewed with patient  reviewed diet, exercise and weight control  very strongly urged to quit smoking to reduce cardiovascular risk  reviewed medications and side effects in detail  radiology results and schedule of future radiology studies reviewed with patient      I have discussed the diagnosis with the patient and the intended plan of care as seen in the above orders. The patient has received an after-visit summary and questions were answered concerning future plans. I have discussed medication, side effects, and warnings with the patient in detail. The patient verbalized understanding and is in agreement with the plan of care. The patient will contact the office with any additional concerns. Corby Guadarrama MD    PLEASE NOTE:   This document has been produced using voice recognition software.  Unrecognized errors in transcription may be present

## 2020-07-22 NOTE — PROGRESS NOTES
Zafar Robles presents today for   Chief Complaint   Patient presents with    Hypertension       Is someone accompanying this pt? yes    Is the patient using any DME equipment during OV? Yes   wheelchair    Depression Screening:  3 most recent PHQ Screens 7/22/2020   Little interest or pleasure in doing things Not at all   Feeling down, depressed, irritable, or hopeless Not at all   Total Score PHQ 2 0   Trouble falling or staying asleep, or sleeping too much Not at all   Feeling tired or having little energy More than half the days   Poor appetite, weight loss, or overeating More than half the days   Feeling bad about yourself - or that you are a failure or have let yourself or your family down Not at all   Trouble concentrating on things such as school, work, reading, or watching TV More than half the days   Moving or speaking so slowly that other people could have noticed; or the opposite being so fidgety that others notice Not at all   Thoughts of being better off dead, or hurting yourself in some way Not at all   PHQ 9 Score 6   How difficult have these problems made it for you to do your work, take care of your home and get along with others Somewhat difficult       Learning Assessment:  No flowsheet data found. Abuse Screening:  No flowsheet data found. Fall Risk  No flowsheet data found. Health Maintenance reviewed and discussed and ordered per Provider. Health Maintenance Due   Topic Date Due    DTaP/Tdap/Td series (1 - Tdap) 12/25/1980    Shingrix Vaccine Age 50> (1 of 2) 12/25/2009    FOBT Q1Y Age 50-75  12/25/2009   . Coordination of Care:  1. Have you been to the ER, urgent care clinic since your last visit? Hospitalized since your last visit? no    2. Have you seen or consulted any other health care providers outside of the [address] [place] [address] since your last visit? Include any pap smears or colon screening.  no

## 2020-11-18 ENCOUNTER — OFFICE VISIT (OUTPATIENT)
Dept: FAMILY MEDICINE CLINIC | Age: 60
End: 2020-11-18
Payer: MEDICAID

## 2020-11-18 VITALS
WEIGHT: 139 LBS | RESPIRATION RATE: 18 BRPM | DIASTOLIC BLOOD PRESSURE: 70 MMHG | OXYGEN SATURATION: 99 % | HEIGHT: 64 IN | HEART RATE: 80 BPM | TEMPERATURE: 99.5 F | SYSTOLIC BLOOD PRESSURE: 127 MMHG | BODY MASS INDEX: 23.73 KG/M2

## 2020-11-18 DIAGNOSIS — J45.41 MODERATE PERSISTENT ASTHMATIC BRONCHITIS WITH ACUTE EXACERBATION: ICD-10-CM

## 2020-11-18 DIAGNOSIS — G44.221 CHRONIC TENSION-TYPE HEADACHE, INTRACTABLE: Primary | ICD-10-CM

## 2020-11-18 DIAGNOSIS — Z72.0 TOBACCO ABUSE DISORDER: ICD-10-CM

## 2020-11-18 DIAGNOSIS — Z13.220 LIPID SCREENING: ICD-10-CM

## 2020-11-18 DIAGNOSIS — E61.1 IRON DEFICIENCY: ICD-10-CM

## 2020-11-18 DIAGNOSIS — F25.9 SCHIZOAFFECTIVE DISORDER, UNSPECIFIED TYPE (HCC): ICD-10-CM

## 2020-11-18 LAB
CREATININE, EXTERNAL: 0.5
LDL-C, EXTERNAL: 85

## 2020-11-18 PROCEDURE — 36415 COLL VENOUS BLD VENIPUNCTURE: CPT | Performed by: FAMILY MEDICINE

## 2020-11-18 PROCEDURE — 99214 OFFICE O/P EST MOD 30 MIN: CPT | Performed by: FAMILY MEDICINE

## 2020-11-18 RX ORDER — METHAZOLAMIDE 50 MG/1
TABLET ORAL
COMMUNITY
Start: 2020-10-30

## 2020-11-18 RX ORDER — BRIMONIDINE TARTRATE, TIMOLOL MALEATE 2; 5 MG/ML; MG/ML
SOLUTION/ DROPS OPHTHALMIC
COMMUNITY
Start: 2020-10-15

## 2020-11-18 NOTE — PROGRESS NOTES
Chief Complaint   Patient presents with    Follow-up     elevated blood pressure     Headache     x 2 weeks      1. Have you been to the ER, urgent care clinic since your last visit? Hospitalized since your last visit? No    2. Have you seen or consulted any other health care providers outside of the [address] [place] [address] since your last visit? Include any pap smears or colon screening. No     HPI  Albino Dainel comes in for f/u care. Headache: Patient has a chronic headache. This has been ongoing for about a month. It is frontal in nature. Occasionally has changes in vision. She takes over-the-counter pain medication with transient relief. No nausea, vomiting, seizure activity or focal weakness. Given the chronicity of the headache I will check labs and do a head CT scan. I will follow-up with the results. Iron deficiency: Patient has a history of iron deficiency. She is on iron replacement therapy. Most recent iron studies have been stable. We will check a CBC. Weight loss: Patient has a history of weight loss. Her weight has increased lately. Appetite is better. She is on Periactin. She will continue with the medication. She denies abdominal pain, nausea or vomiting. Behavioral health: Patient has a history of schizoaffective disorder. She is being followed up by the behavioral health specialist.  She is on Risperdal.  She will continue with the medication. Asthma: Patient has asthma. She does have episodes of wheezing on and off. She uses her inhaler medication. Denies cough, chest tightness or shortness of breath. Tobacco abuse disorder: Patient continues to smoke. She is not ready to quit. We discussed tobacco cessation and advantages of quitting the habit. She will let us know when she is ready. Hypertension: Patient was seen at a different medical facility and noted to have elevated blood pressure. Today this is stable. She will keep a blood pressure log.   Health maintenance: Patient has had a colonoscopy done 3 years ago. She will do the FIT stool kit for colon cancer screening. Declines to have the flu vaccine. Past Medical History  Past Medical History:   Diagnosis Date    Anemia     Psychotic disorder ([address]        Surgical History  Past Surgical History:   Procedure Laterality Date    HX GYN      etopic pregnacy 1970\"s        Medications  Current Outpatient Medications   Medication Sig Dispense Refill    methazolAMIDE (NEPTAZANE) 50 mg tablet take 1 tablet by mouth once daily      Combigan 0.2-0.5 % drop ophthalmic solution instill 1 drop into both eyes twice a day      albuterol (ProAir HFA) 90 mcg/actuation inhaler inhale 2 puffs by mouth every 6 hours if needed for wheezing 8.5 g 1    acetaminophen (Arthritis Pain Relief) 650 mg TbER Take 1 Tab by mouth every eight (8) hours as needed for Pain. 90 Tab 0    ferrous sulfate 324 mg (65 mg iron) tablet Take 1 Tab by mouth Daily (before breakfast). 180 Tab 1    risperiDONE (RISPERDAL) 4 mg tablet Take  by mouth.  cyproheptadine (PERIACTIN) 4 mg tablet Take  by mouth three (3) times daily as needed.  timolol (TIMOPTIC) 0.5 % ophthalmic solution 1 Drop two (2) times a day.          Allergies  No Known Allergies    Family History  Family History   Problem Relation Age of Onset    Cancer Mother     Diabetes Mother     Hypertension Mother     Cancer Maternal Aunt     Diabetes Maternal Aunt     Hypertension Maternal Aunt     Cancer Maternal Grandmother     Diabetes Maternal Grandmother     Hypertension Maternal Grandmother        Social History  Social History     Socioeconomic History    Marital status: SINGLE     Spouse name: Not on file    Number of children: Not on file    Years of education: Not on file    Highest education level: Not on file   Occupational History    Not on file   Social Needs    Financial resource strain: Not on file    Food insecurity     Worry: Not on file Inability: Not on file    Transportation needs     Medical: Not on file     Non-medical: Not on file   Tobacco Use    Smoking status: Current Every Day Smoker    Smokeless tobacco: Never Used   Substance and Sexual Activity    Alcohol use: Yes     Comment: occasionally    Drug use: Not Currently    Sexual activity: Not Currently   Lifestyle    Physical activity     Days per week: Not on file     Minutes per session: Not on file    Stress: Not on file   Relationships    Social connections     Talks on phone: Not on file     Gets together: Not on file     Attends Mormonism service: Not on file     Active member of club or organization: Not on file     Attends meetings of clubs or organizations: Not on file     Relationship status: Not on file    Intimate partner violence     Fear of current or ex partner: Not on file     Emotionally abused: Not on file     Physically abused: Not on file     Forced sexual activity: Not on file   Other Topics Concern    Not on file   Social History Narrative    Not on file       Review of Systems  Review of Systems - Review of all systems is negative except as noted above in the HPI.     Vital Signs  Visit Vitals  /70 (BP 1 Location: Left arm, BP Patient Position: Sitting)   Pulse 80   Temp 99.5 °F (37.5 °C) (Oral)   Resp 18   Ht 5' 4\" (1.626 m)   Wt 139 lb (63 kg)   SpO2 99%   BMI 23.86 kg/m²         Physical Exam  Physical Examination: General appearance - alert, well appearing, and in no distress, oriented to person, place, and time, acyanotic, in no respiratory distress and well hydrated  Mental status - alert, oriented to person, place, and time, affect appropriate to mood  Neck - supple, no significant adenopathy  Lymphatics - no palpable lymphadenopathy, no hepatosplenomegaly  Chest - no tachypnea, retractions or cyanosis  Heart - S1 and S2 normal  Abdomen - no rebound tenderness noted  Back exam - limited range of motion  Neurological - abnormal neurological exam unchanged from prior examinations  Musculoskeletal - osteoarthritic changes noted in both hands  Extremities - no pedal edema noted, intact peripheral pulses      Results  Results for orders placed or performed in visit on 01/22/20   CHLAM/GC/TRICH NUCL AMP THIN PREP   Result Value Ref Range    CHLAMYDIA TRACHOMATIS THINPREP Negative Negative    NEISSERIA GONORRHOEAE THINPREP Negative Negative    TRICHOMONAS NUC AMP-THIN PREP POSITIVE (A) Negative   HPV RFX 16&18   Result Value Ref Range    HPV H RFLX Negative Negative   PAP IG (IMAGE GUIDED)   Result Value Ref Range    PAP IMAGE GUIDED         ASSESSMENT and PLAN    ICD-10-CM ICD-9-CM    1. Chronic tension-type headache, intractable  G44.221 339.12 CT HEAD WO CONT      CBC WITH AUTOMATED DIFF      METABOLIC PANEL, COMPREHENSIVE      METABOLIC PANEL, COMPREHENSIVE      CBC WITH AUTOMATED DIFF   2. [facility]  F25.9 295.70    3. Moderate persistent asthmatic bronchitis with acute exacerbation  J45.41 493.92    4. Iron deficiency  E61.1 280.9 CBC WITH AUTOMATED DIFF      CBC WITH AUTOMATED DIFF      COLLECTION VENOUS BLOOD,VENIPUNCTURE   5. Lipid screening  D14.094 H82.12 METABOLIC PANEL, COMPREHENSIVE      LIPID PANEL      LIPID PANEL      METABOLIC PANEL, COMPREHENSIVE      COLLECTION VENOUS BLOOD,VENIPUNCTURE   6. Tobacco abuse disorder  Z72.0 305.1      lab results and schedule of future lab studies reviewed with patient  reviewed diet, exercise and weight control  cardiovascular risk and specific lipid/LDL goals reviewed  reviewed medications and side effects in detail      I have discussed the diagnosis with the patient and the intended plan of care as seen in the above orders. The patient has received an after-visit summary and questions were answered concerning future plans. I have discussed medication, side effects, and warnings with the patient in detail.  The patient verbalized understanding and is in agreement with the plan of care. The patient will contact the office with any additional concerns. Mansoor Jain MD    PLEASE NOTE:   This document has been produced using voice recognition software.  Unrecognized errors in transcription may be present

## 2020-11-19 LAB
A-G RATIO,AGRAT: 1.4 RATIO (ref 1.1–2.6)
ABSOLUTE LYMPHOCYTE COUNT, 10803: 2.3 K/UL (ref 1–4.8)
ALBUMIN SERPL-MCNC: 3.9 G/DL (ref 3.5–5)
ALP SERPL-CCNC: 80 U/L (ref 40–120)
ALT SERPL-CCNC: 16 U/L (ref 5–40)
ANION GAP SERPL CALC-SCNC: 18.2 MMOL/L (ref 3–15)
AST SERPL W P-5'-P-CCNC: 27 U/L (ref 10–37)
BASOPHILS # BLD: 0 K/UL (ref 0–0.2)
BASOPHILS NFR BLD: 0 % (ref 0–2)
BILIRUB SERPL-MCNC: <0.1 MG/DL (ref 0.2–1.2)
BUN SERPL-MCNC: 7 MG/DL (ref 6–22)
CALCIUM SERPL-MCNC: 9.8 MG/DL (ref 8.4–10.5)
CHLORIDE SERPL-SCNC: 102 MMOL/L (ref 98–110)
CHOLEST SERPL-MCNC: 195 MG/DL (ref 110–200)
CO2 SERPL-SCNC: 23 MMOL/L (ref 20–32)
CREAT SERPL-MCNC: 0.5 MG/DL (ref 0.8–1.4)
EOSINOPHIL # BLD: 0.1 K/UL (ref 0–0.5)
EOSINOPHIL NFR BLD: 1 % (ref 0–6)
ERYTHROCYTE [DISTWIDTH] IN BLOOD BY AUTOMATED COUNT: 17.7 % (ref 10–15.5)
GFRAA, 66117: >60
GFRNA, 66118: >60
GLOBULIN,GLOB: 2.8 G/DL (ref 2–4)
GLUCOSE SERPL-MCNC: 96 MG/DL (ref 70–99)
GRANULOCYTES,GRANS: 60 % (ref 40–75)
HCT VFR BLD AUTO: 37.2 % (ref 35.1–48)
HDLC SERPL-MCNC: 2.4 MG/DL (ref 0–5)
HDLC SERPL-MCNC: 80 MG/DL
HGB BLD-MCNC: 11.1 G/DL (ref 11.7–16)
LDL/HDL RATIO,LDHD: 1.1
LDLC SERPL CALC-MCNC: 85 MG/DL (ref 50–99)
LYMPHOCYTES, LYMLT: 28 % (ref 20–45)
MCH RBC QN AUTO: 30 PG (ref 26–34)
MCHC RBC AUTO-ENTMCNC: 30 G/DL (ref 31–36)
MCV RBC AUTO: 99 FL (ref 81–99)
MONOCYTES # BLD: 0.8 K/UL (ref 0.1–1)
MONOCYTES NFR BLD: 10 % (ref 3–12)
NEUTROPHILS # BLD AUTO: 5 K/UL (ref 1.8–7.7)
NON-HDL CHOLESTEROL, 011976: 115 MG/DL
PLATELET # BLD AUTO: 262 K/UL (ref 140–440)
PMV BLD AUTO: 12.1 FL (ref 9–13)
POTASSIUM SERPL-SCNC: 3.9 MMOL/L (ref 3.5–5.5)
PROT SERPL-MCNC: 6.7 G/DL (ref 6.2–8.1)
RBC # BLD AUTO: 3.75 M/UL (ref 3.8–5.2)
SODIUM SERPL-SCNC: 143 MMOL/L (ref 133–145)
TRIGL SERPL-MCNC: 148 MG/DL (ref 40–149)
VLDLC SERPL CALC-MCNC: 30 MG/DL (ref 8–30)
WBC # BLD AUTO: 8.3 K/UL (ref 4–11)

## 2021-03-15 RX ORDER — FERROUS SULFATE 324(65)MG
TABLET, DELAYED RELEASE (ENTERIC COATED) ORAL
Qty: 180 TAB | Refills: 1 | Status: SHIPPED | OUTPATIENT
Start: 2021-03-15 | End: 2022-01-27

## 2021-05-26 ENCOUNTER — OFFICE VISIT (OUTPATIENT)
Dept: FAMILY MEDICINE CLINIC | Age: 61
End: 2021-05-26
Payer: MEDICAID

## 2021-05-26 VITALS
SYSTOLIC BLOOD PRESSURE: 97 MMHG | HEIGHT: 64 IN | WEIGHT: 133 LBS | TEMPERATURE: 98.6 F | OXYGEN SATURATION: 96 % | DIASTOLIC BLOOD PRESSURE: 58 MMHG | RESPIRATION RATE: 18 BRPM | BODY MASS INDEX: 22.71 KG/M2 | HEART RATE: 90 BPM

## 2021-05-26 DIAGNOSIS — R05.3 CHRONIC COUGH: ICD-10-CM

## 2021-05-26 DIAGNOSIS — Z12.11 SCREEN FOR COLON CANCER: ICD-10-CM

## 2021-05-26 DIAGNOSIS — F25.9 SCHIZOAFFECTIVE DISORDER, UNSPECIFIED TYPE (HCC): ICD-10-CM

## 2021-05-26 DIAGNOSIS — G89.29 CHRONIC PAIN OF BOTH KNEES: ICD-10-CM

## 2021-05-26 DIAGNOSIS — M25.562 CHRONIC PAIN OF BOTH KNEES: ICD-10-CM

## 2021-05-26 DIAGNOSIS — Z72.0 TOBACCO ABUSE DISORDER: ICD-10-CM

## 2021-05-26 DIAGNOSIS — M25.561 CHRONIC PAIN OF BOTH KNEES: ICD-10-CM

## 2021-05-26 DIAGNOSIS — J45.41 MODERATE PERSISTENT ASTHMATIC BRONCHITIS WITH ACUTE EXACERBATION: Primary | ICD-10-CM

## 2021-05-26 PROCEDURE — 99214 OFFICE O/P EST MOD 30 MIN: CPT | Performed by: FAMILY MEDICINE

## 2021-05-26 RX ORDER — DEXTROMETHORPHAN HYDROBROMIDE, GUAIFENESIN 5; 100 MG/5ML; MG/5ML
650 LIQUID ORAL
Qty: 90 TABLET | Refills: 0 | Status: SHIPPED | OUTPATIENT
Start: 2021-05-26 | End: 2022-01-27

## 2021-05-26 RX ORDER — BUDESONIDE AND FORMOTEROL FUMARATE DIHYDRATE 160; 4.5 UG/1; UG/1
2 AEROSOL RESPIRATORY (INHALATION) 2 TIMES DAILY
Qty: 1 INHALER | Refills: 1 | Status: SHIPPED | OUTPATIENT
Start: 2021-05-26 | End: 2021-09-27 | Stop reason: SDUPTHER

## 2021-05-26 RX ORDER — ALBUTEROL SULFATE 90 UG/1
AEROSOL, METERED RESPIRATORY (INHALATION)
Qty: 8.5 G | Refills: 1 | Status: SHIPPED | OUTPATIENT
Start: 2021-05-26 | End: 2021-09-27 | Stop reason: SDUPTHER

## 2021-05-26 NOTE — PROGRESS NOTES
Chief Complaint   Patient presents with    Follow Up Chronic Condition     1. Have you been to the ER, urgent care clinic since your last visit? Hospitalized since your last visit? No    2. Have you seen or consulted any other health care providers outside of the [address] [place] [address] since your last visit? Include any pap smears or colon screening. No     HPI  Clemente Boyd comes in for f/u care. She is accompanied by her caregiver. Cough: Patient has a cough that comes on and off. This started yesterday. It is occasionally nonproductive. Denies chest pain, hemoptysis, chest tightness, fever or chills. She has a history of asthma. She can take over-the-counter cough medication. I will follow-up if cough persists. May need to have a chest x-ray done. Asthma: Patient has asthma. Gets wheeze and shortness of breath that comes on and off. She has used albuterol inhaler with relief. I will add Symbicort to take twice a day. Tobacco abuse disorder: Patient smokes more than a pack a day. This has been worse since her mother . Discussed need to quit the habit. She does not want any medication to help with tobacco cessation at the moment. Does not want any nicotine replacement therapy. She however states that she will cut back. I did emphasize the need to completely quit smoking. Psychotic disorder: Patient has history of psychotic disorder. She is on Risperdal.  She has been followed up by the behavioral health specialist.  Recently her mom passed away and this has caused patient to have mood swings. She needs to be seen by her behavioral health specialist.  She will continue with medication. Knee pain: Patient has chronic bilateral knee pain. Pain comes with activity including walking and bearing weight. She has used Tylenol arthritis with relief. Would like a refill of medication sent into the pharmacy. HM: Patient needs to have colon cancer screening done. She has FIT stool kit. She will get a sample and bring this in for evaluation. Past Medical History  Past Medical History:   Diagnosis Date    Anemia     Psychotic disorder ([facility] [address]        Surgical History  Past Surgical History:   Procedure Laterality Date    HX GYN      etopic pregnacy 1970\"s        Medications  Current Outpatient Medications   Medication Sig Dispense Refill    acetaminophen (Arthritis Pain Relief) 650 mg TbER Take 1 Tablet by mouth every eight (8) hours as needed for Pain. 90 Tablet 0    albuterol (ProAir HFA) 90 mcg/actuation inhaler inhale 2 puffs by mouth every 6 hours if needed for wheezing 8.5 g 1    methazolAMIDE (NEPTAZANE) 50 mg tablet take 1 tablet by mouth once daily      Combigan 0.2-0.5 % drop ophthalmic solution instill 1 drop into both eyes twice a day      risperiDONE (RisperDAL) 4 mg tablet Take  by mouth.  cyproheptadine (PERIACTIN) 4 mg tablet Take  by mouth three (3) times daily as needed.  timolol (TIMOPTIC) 0.5 % ophthalmic solution 1 Drop two (2) times a day.  ferrous sulfate 324 mg (65 mg iron) tablet take 1 tablet by mouth daily before breakfast (Patient not taking: Reported on 5/26/2021) 180 Tab 1       Allergies  No Known Allergies    Family History  Family History   Problem Relation Age of Onset    Cancer Mother     Diabetes Mother     Hypertension Mother     Cancer Maternal Aunt     Diabetes Maternal Aunt     Hypertension Maternal Aunt     Cancer Maternal Grandmother     Diabetes Maternal Grandmother     Hypertension Maternal Grandmother        Social History  Social History     Socioeconomic History    Marital status: SINGLE     Spouse name: Not on file    Number of children: Not on file    Years of education: Not on file    Highest education level: Not on file   Occupational History    Not on file   Tobacco Use    Smoking status: Current Every Day Smoker    Smokeless tobacco: Never Used   Substance and Sexual Activity    Alcohol use:  Yes Comment: occasionally    Drug use: Not Currently    Sexual activity: Not Currently   Other Topics Concern    Not on file   Social History Narrative    Not on file     Social Determinants of Health     Financial Resource Strain:     Difficulty of Paying Living Expenses:    Food Insecurity:     Worried About Running Out of Food in the Last Year:     [address] in the Last Year:    Transportation Needs:     Lack of Transportation (Medical):  Lack of Transportation (Non-Medical):    Physical Activity:     Days of Exercise per Week:     Minutes of Exercise per Session:    Stress:     Feeling of Stress :    Social Connections:     Frequency of Communication with Friends and Family:     Frequency of Social Gatherings with Friends and Family:     Attends Ep[employer] Services:     Active Member of Clubs or Organizations:     Attends Club or Organization Meetings:     Marital Status:    Intimate Partner Violence:     Fear of Current or Ex-Partner:     Emotionally Abused:     Physically Abused:     Sexually Abused:        Review of Systems  Review of Systems - Review of all systems is negative except as noted above in the HPI.     Vital Signs  Visit Vitals  BP (!) 97/58   Pulse 90   Temp 98.6 °F (37 °C) (Oral)   Resp 18   Ht 5' 4\" (1.626 m)   Wt 133 lb (60.3 kg)   SpO2 96%   BMI 22.83 kg/m²         Physical Exam  Physical Examination: General appearance - acyanotic, in no respiratory distress  Mental status - inappropriate safety awareness  Neck - supple, no significant adenopathy  Lymphatics - no palpable lymphadenopathy  Chest - clear to auscultation, no wheezes, rales or rhonchi, symmetric air entry  Heart - S1 and S2 normal  Abdomen - no rebound tenderness noted  Back exam - limited range of motion  Neurological - abnormal neurological exam unchanged from prior examinations  Musculoskeletal - osteoarthritic changes noted in both hands  Extremities - no pedal edema noted, intact peripheral pulses      Results  Results for orders placed or performed in visit on 02/05/21   AMB EXT LDL-C   Result Value Ref Range    LDL-C, External 85    AMB EXT CREATININE   Result Value Ref Range    Creatinine, External 0.5        ASSESSMENT and PLAN    ICD-10-CM ICD-9-CM    1. Screen for colon cancer  Z12.11 V76.51 OCCULT BLOOD IMMUNOASSAY,DIAGNOSTIC   2. Chronic pain of both knees  M25.561 719.46 acetaminophen (Arthritis Pain Relief) 650 mg TbER    M25.562 338.29     G89.29     3. Moderate persistent asthmatic bronchitis with acute exacerbation  J45.41 493.92 albuterol (ProAir HFA) 90 mcg/actuation inhaler      budesonide-formoteroL (SYMBICORT) 160-4.5 mcg/actuation HFAA     lab results and schedule of future lab studies reviewed with patient  reviewed diet, exercise and weight control  cardiovascular risk and specific lipid/LDL goals reviewed  reviewed medications and side effects in detail      I have discussed the diagnosis with the patient and the intended plan of care as seen in the above orders. The patient has received an after-visit summary and questions were answered concerning future plans. I have discussed medication, side effects, and warnings with the patient in detail. The patient verbalized understanding and is in agreement with the plan of care. The patient will contact the office with any additional concerns. Merlene De La Rosa MD    PLEASE NOTE:   This document has been produced using voice recognition software.  Unrecognized errors in transcription may be present

## 2021-09-27 ENCOUNTER — OFFICE VISIT (OUTPATIENT)
Dept: FAMILY MEDICINE CLINIC | Age: 61
End: 2021-09-27
Payer: MEDICAID

## 2021-09-27 VITALS
OXYGEN SATURATION: 96 % | RESPIRATION RATE: 18 BRPM | BODY MASS INDEX: 22.88 KG/M2 | TEMPERATURE: 96.4 F | HEIGHT: 64 IN | WEIGHT: 134 LBS | DIASTOLIC BLOOD PRESSURE: 56 MMHG | SYSTOLIC BLOOD PRESSURE: 102 MMHG | HEART RATE: 87 BPM

## 2021-09-27 DIAGNOSIS — Z13.220 LIPID SCREENING: ICD-10-CM

## 2021-09-27 DIAGNOSIS — J45.41 MODERATE PERSISTENT ASTHMATIC BRONCHITIS WITH ACUTE EXACERBATION: Primary | ICD-10-CM

## 2021-09-27 DIAGNOSIS — F25.9 SCHIZOAFFECTIVE DISORDER, UNSPECIFIED TYPE (HCC): ICD-10-CM

## 2021-09-27 DIAGNOSIS — E61.1 IRON DEFICIENCY: ICD-10-CM

## 2021-09-27 DIAGNOSIS — M25.50 ARTHRALGIA, UNSPECIFIED JOINT: ICD-10-CM

## 2021-09-27 DIAGNOSIS — Z12.11 SCREEN FOR COLON CANCER: ICD-10-CM

## 2021-09-27 DIAGNOSIS — Z72.0 TOBACCO ABUSE DISORDER: ICD-10-CM

## 2021-09-27 PROBLEM — H40.1130 PRIMARY OPEN ANGLE GLAUCOMA (POAG) OF BOTH EYES: Status: ACTIVE | Noted: 2017-04-24

## 2021-09-27 PROBLEM — H04.129 TEAR FILM INSUFFICIENCY: Status: ACTIVE | Noted: 2021-09-27

## 2021-09-27 PROBLEM — Z96.1 BILATERAL PSEUDOPHAKIA: Status: ACTIVE | Noted: 2017-04-24

## 2021-09-27 PROBLEM — H40.10X0 OPEN-ANGLE GLAUCOMA: Status: ACTIVE | Noted: 2021-09-27

## 2021-09-27 PROCEDURE — 99214 OFFICE O/P EST MOD 30 MIN: CPT | Performed by: FAMILY MEDICINE

## 2021-09-27 RX ORDER — ALBUTEROL SULFATE 90 UG/1
AEROSOL, METERED RESPIRATORY (INHALATION)
Qty: 8.5 G | Refills: 1 | Status: SHIPPED | OUTPATIENT
Start: 2021-09-27 | End: 2022-06-22 | Stop reason: SDUPTHER

## 2021-09-27 RX ORDER — BUDESONIDE AND FORMOTEROL FUMARATE DIHYDRATE 160; 4.5 UG/1; UG/1
2 AEROSOL RESPIRATORY (INHALATION) 2 TIMES DAILY
Qty: 1 EACH | Refills: 2 | Status: SHIPPED | OUTPATIENT
Start: 2021-09-27 | End: 2022-06-22 | Stop reason: SDUPTHER

## 2021-09-27 NOTE — PROGRESS NOTES
Chief Complaint   Patient presents with    Follow Up Chronic Condition    Heartburn     1. Have you been to the ER, urgent care clinic since your last visit? Hospitalized since your last visit? No    2. Have you seen or consulted any other health care providers outside of the [address] [place] [address] since your last visit? Include any pap smears or colon screening.  No

## 2021-09-27 NOTE — PROGRESS NOTES
DANA Mccall comes in for f/u care. She is accompanied by her caregiver. COPD: Patient has COPD. She is on Symbicort and albuterol inhalers. Has occasional cough, wheeze. Denies fever or chills. Cough is productive of clear phlegm. Denies hemoptysis or chest pain. We will continue with the current treatment plan. Schizoaffective disorder: Patient has schizoaffective disorder. She is on Risperdal.  Has been followed up by behavioral health specialist.  We will continue current treatment plan. Stable. Arthralgia: Patient has generalized arthralgia. This affects the hands and her lower extremities especially the knees and the feet. She takes Tylenol arthritis for pain. Has also used ibuprofen as needed for pain. Tobacco abuse disorder: Patient continues to smoke. States that she has cut back to less than a pack a day. Advised strongly to quit the habit of smoking. Iron deficiency: Patient has a history of iron deficiency. We will recheck labs. Health maintenance: Patient declines a flu vaccine. I will place a referral for colon cancer screening. Past Medical History  Past Medical History:   Diagnosis Date    Anemia     Psychotic disorder ([facility] [address]        Surgical History  Past Surgical History:   Procedure Laterality Date    HX GYN      etopic pregnacy 1970\"s        Medications  Current Outpatient Medications   Medication Sig Dispense Refill    albuterol (ProAir HFA) 90 mcg/actuation inhaler inhale 2 puffs by mouth every 6 hours if needed for wheezing 8.5 g 1    budesonide-formoteroL (SYMBICORT) 160-4.5 mcg/actuation HFAA Take 2 Puffs by inhalation two (2) times a day. 1 Inhaler 1    methazolAMIDE (NEPTAZANE) 50 mg tablet take 1 tablet by mouth once daily      Combigan 0.2-0.5 % drop ophthalmic solution instill 1 drop into both eyes twice a day      risperiDONE (RisperDAL) 4 mg tablet Take  by mouth.       cyproheptadine (PERIACTIN) 4 mg tablet Take  by mouth three (3) times daily as needed.  timolol (TIMOPTIC) 0.5 % ophthalmic solution 1 Drop two (2) times a day.  acetaminophen (Arthritis Pain Relief) 650 mg TbER Take 1 Tablet by mouth every eight (8) hours as needed for Pain. (Patient not taking: Reported on 9/27/2021) 90 Tablet 0    ferrous sulfate 324 mg (65 mg iron) tablet take 1 tablet by mouth daily before breakfast (Patient not taking: Reported on 5/26/2021) 180 Tab 1       Allergies  No Known Allergies    Family History  Family History   Problem Relation Age of Onset    Cancer Mother     Diabetes Mother     Hypertension Mother     Cancer Maternal Aunt     Diabetes Maternal Aunt     Hypertension Maternal Aunt     Cancer Maternal Grandmother     Diabetes Maternal Grandmother     Hypertension Maternal Grandmother        Social History  Social History     Socioeconomic History    Marital status: SINGLE     Spouse name: Not on file    Number of children: Not on file    Years of education: Not on file    Highest education level: Not on file   Occupational History    Not on file   Tobacco Use    Smoking status: Current Every Day Smoker    Smokeless tobacco: Never Used   Vaping Use    Vaping Use: Never used   Substance and Sexual Activity    Alcohol use: Yes     Comment: occasionally    Drug use: Not Currently    Sexual activity: Not Currently   Other Topics Concern    Not on file   Social History Narrative    Not on file     Social Determinants of Health     Financial Resource Strain:     Difficulty of Paying Living Expenses:    Food Insecurity:     Worried About Running Out of Food in the Last Year:     [address] in the Last Year:    Transportation Needs:     Lack of Transportation (Medical):      Lack of Transportation (Non-Medical):    Physical Activity:     Days of Exercise per Week:     Minutes of Exercise per Session:    Stress:     Feeling of Stress :    Social Connections:     Frequency of Communication with Friends and Family:     Frequency of Social Gatherings with Friends and Family:     Attends Yazdanism Services:     Active Member of Clubs or Organizations:     Attends Club or Organization Meetings:     Marital Status:    Intimate Partner Violence:     Fear of Current or Ex-Partner:     Emotionally Abused:     Physically Abused:     Sexually Abused:        Review of Systems  Review of Systems - Review of all systems is negative except as noted above in the HPI. Vital Signs  Visit Vitals  BP (!) 102/56 (BP 1 Location: Left upper arm, BP Patient Position: Sitting, BP Cuff Size: Adult)   Pulse 87   Temp (!) 96.4 °F (35.8 °C) (Oral)   Resp 18   Ht 5' 4\" (1.626 m)   Wt 134 lb (60.8 kg)   SpO2 96%   BMI 23.00 kg/m²         Physical Exam  Physical Examination: General appearance - oriented to person, place, and time and acyanotic, in no respiratory distress  Mental status - affect appropriate to mood  Lymphatics - no palpable lymphadenopathy  Chest - decreased air entry noted bilateral lung bases  Heart - systolic murmur 2/6 at lower left sternal border  Abdomen - no rebound tenderness noted  Back exam - limited range of motion  Neurological - abnormal neurological exam unchanged from prior examinations  Musculoskeletal - osteoarthritic changes noted in both hands  Extremities - intact peripheral pulses      Results  Results for orders placed or performed in visit on 02/05/21   AMB EXT LDL-C   Result Value Ref Range    LDL-C, External 85    AMB EXT CREATININE   Result Value Ref Range    Creatinine, External 0.5        ASSESSMENT and PLAN    ICD-10-CM ICD-9-CM    1. Moderate persistent asthmatic bronchitis with acute exacerbation  J45.41 493.92 budesonide-formoteroL (SYMBICORT) 160-4.5 mcg/actuation HFAA      albuterol (ProAir HFA) 90 mcg/actuation inhaler   2. Schizoaffective disorder, unspecified type ([facility] [address]  F25.9 295.70    3. Arthralgia, unspecified joint  M25.50 719.40    4. Tobacco abuse disorder  Z72.0 305.1    5.  Lipid screening Z13.220 V77.91 LIPID PANEL      METABOLIC PANEL, COMPREHENSIVE   6. Iron deficiency  E61.1 280.9 CBC WITH AUTOMATED DIFF   7. Screen for colon cancer  Z12.11 V76.51 REFERRAL TO GASTROENTEROLOGY     lab results and schedule of future lab studies reviewed with patient  reviewed diet, exercise and weight control  cardiovascular risk and specific lipid/LDL goals reviewed  reviewed medications and side effects in detail      I have discussed the diagnosis with the patient and the intended plan of care as seen in the above orders. The patient has received an after-visit summary and questions were answered concerning future plans. I have discussed medication, side effects, and warnings with the patient in detail. The patient verbalized understanding and is in agreement with the plan of care. The patient will contact the office with any additional concerns. Greer Medina MD    PLEASE NOTE:   This document has been produced using voice recognition software.  Unrecognized errors in transcription may be present

## 2022-01-27 ENCOUNTER — OFFICE VISIT (OUTPATIENT)
Dept: FAMILY MEDICINE CLINIC | Age: 62
End: 2022-01-27
Payer: MEDICAID

## 2022-01-27 VITALS
TEMPERATURE: 98.2 F | RESPIRATION RATE: 20 BRPM | BODY MASS INDEX: 22.36 KG/M2 | HEIGHT: 64 IN | WEIGHT: 131 LBS | SYSTOLIC BLOOD PRESSURE: 119 MMHG | HEART RATE: 88 BPM | OXYGEN SATURATION: 94 % | DIASTOLIC BLOOD PRESSURE: 70 MMHG

## 2022-01-27 DIAGNOSIS — Z12.31 SCREENING MAMMOGRAM FOR BREAST CANCER: ICD-10-CM

## 2022-01-27 DIAGNOSIS — F25.9 SCHIZOAFFECTIVE DISORDER, UNSPECIFIED TYPE (HCC): ICD-10-CM

## 2022-01-27 DIAGNOSIS — M25.50 ARTHRALGIA, UNSPECIFIED JOINT: Primary | ICD-10-CM

## 2022-01-27 DIAGNOSIS — R63.0 POOR APPETITE: ICD-10-CM

## 2022-01-27 DIAGNOSIS — J45.41 MODERATE PERSISTENT ASTHMATIC BRONCHITIS WITH ACUTE EXACERBATION: ICD-10-CM

## 2022-01-27 DIAGNOSIS — Z72.0 TOBACCO ABUSE DISORDER: ICD-10-CM

## 2022-01-27 PROCEDURE — 99213 OFFICE O/P EST LOW 20 MIN: CPT | Performed by: FAMILY MEDICINE

## 2022-01-27 RX ORDER — IBUPROFEN 800 MG/1
800 TABLET ORAL
Qty: 90 TABLET | Refills: 0 | Status: SHIPPED | OUTPATIENT
Start: 2022-01-27 | End: 2022-06-22 | Stop reason: SDUPTHER

## 2022-01-27 RX ORDER — MEGESTROL ACETATE 20 MG/1
20 TABLET ORAL DAILY
Qty: 30 TABLET | Refills: 2 | Status: SHIPPED | OUTPATIENT
Start: 2022-01-27

## 2022-01-27 NOTE — PROGRESS NOTES
Chief Complaint   Patient presents with    Follow Up Chronic Condition     concerns about low BP     1. \"Have you been to the ER, urgent care clinic since your last visit? Hospitalized since your last visit? \" No    2. \"Have you seen or consulted any other health care providers outside of the [address] [place] [address] since your last visit? \" No     3. For patients aged 39-70: Has the patient had a colonoscopy / FIT/ Cologuard? No      If the patient is female:    4. For patients aged 41-77: Has the patient had a mammogram within the past 2 years? No  See top three    5. For patients aged 21-65: Has the patient had a pap smear?  Yes - no [place] present

## 2022-01-27 NOTE — PROGRESS NOTES
DANA  Glory Cr comes in for follow-up care. Psychotic disorder: Patient has a history of psychotic disorder. Has been followed up by behavioral health specialist.  She is stable. She is on Risperdal.  We will continue with this medication. Arthralgia: Patient has arthralgia. Gets joint aches. This affects her hands, elbows, shoulders and knees. She has used ibuprofen in the past with good result. Would like a refill of medication. Prescription is sent in. Poor appetite: Patient has poor appetite. Her weight has been stable. Denies fever, chills, night sweats. She would like medication however to help with her appetite. Denies abdominal pain, nausea or vomiting. We will send in Megace. Asthma: Patient has a history of asthma. She has an albuterol inhaler and Symbicort that she uses for this. Denies fever, chills, hemoptysis or pleuritic chest pain. Denies chest tightness or wheeze. We will continue current treatment plan. Past Medical History  Past Medical History:   Diagnosis Date    Anemia     Psychotic disorder [facility])        Surgical History  Past Surgical History:   Procedure Laterality Date    HX GYN      etopic pregnacy 1970\"s        Medications  Current Outpatient Medications   Medication Sig Dispense Refill    budesonide-formoteroL (SYMBICORT) 160-4.5 mcg/actuation HFAA Take 2 Puffs by inhalation two (2) times a day. 1 Each 2    albuterol (ProAir HFA) 90 mcg/actuation inhaler inhale 2 puffs by mouth every 6 hours if needed for wheezing 8.5 g 1    methazolAMIDE (NEPTAZANE) 50 mg tablet take 1 tablet by mouth once daily      Combigan 0.2-0.5 % drop ophthalmic solution instill 1 drop into both eyes twice a day      risperiDONE (RisperDAL) 4 mg tablet Take  by mouth.  cyproheptadine (PERIACTIN) 4 mg tablet Take  by mouth three (3) times daily as needed.  timolol (TIMOPTIC) 0.5 % ophthalmic solution 1 Drop two (2) times a day.       acetaminophen (Arthritis Pain Relief) 650 mg TbER Take 1 Tablet by mouth every eight (8) hours as needed for Pain. (Patient not taking: Reported on 9/27/2021) 90 Tablet 0    ferrous sulfate 324 mg (65 mg iron) tablet take 1 tablet by mouth daily before breakfast (Patient not taking: Reported on 5/26/2021) 180 Tab 1       Allergies  No Known Allergies    Family History  Family History   Problem Relation Age of Onset    Cancer Mother     Diabetes Mother     Hypertension Mother     Cancer Maternal Aunt     Diabetes Maternal Aunt     Hypertension Maternal Aunt     Cancer Maternal Grandmother     Diabetes Maternal Grandmother     Hypertension Maternal Grandmother        Social History  Social History     Socioeconomic History    Marital status: SINGLE     Spouse name: Not on file    Number of children: Not on file    Years of education: Not on file    Highest education level: Not on file   Occupational History    Not on file   Tobacco Use    Smoking status: Current Every Day Smoker    Smokeless tobacco: Never Used   Vaping Use    Vaping Use: Never used   Substance and Sexual Activity    Alcohol use: Yes     Comment: occasionally    Drug use: Not Currently    Sexual activity: Not Currently   Other Topics Concern    Not on file   Social History Narrative    Not on file     Social Determinants of Health     Financial Resource Strain:     Difficulty of Paying Living Expenses: Not on file   Food Insecurity:     Worried About [address] in the Last Year: Not on file    [address] in the Last Year: Not on file   Transportation Needs:     Lack of Transportation (Medical): Not on file    Lack of Transportation (Non-Medical):  Not on file   Physical Activity:     Days of Exercise per Week: Not on file    Minutes of Exercise per Session: Not on file   Stress:     Feeling of Stress : Not on file   Social Connections:     Frequency of Communication with Friends and Family: Not on file    Frequency of Social Gatherings with Friends and Family: Not on file    Attends Jewish Services: Not on file    Active Member of Clubs or Organizations: Not on file    Attends Club or Organization Meetings: Not on file    Marital Status: Not on file   Intimate Partner Violence:     Fear of Current or Ex-Partner: Not on file    Emotionally Abused: Not on file    Physically Abused: Not on file    Sexually Abused: Not on file   Housing Stability:     Unable to Pay for Housing in the Last Year: Not on file    Number of [place] in the Last Year: Not on file    Unstable Housing in the Last Year: Not on file       Review of Systems  Review of Systems - Review of all systems is negative except as noted above in the HPI. Vital Signs  Visit Vitals  /70 (BP 1 Location: Left upper arm, BP Patient Position: Sitting, BP Cuff Size: Adult)   Pulse 88   Temp 98.2 °F (36.8 °C) (Oral)   Resp 20   Ht 5' 4\" (1.626 m)   Wt 131 lb (59.4 kg)   SpO2 94%   BMI 22.49 kg/m²         Physical Exam  Physical Examination: General appearance - acyanotic, in no respiratory distress and patient has visual loss  Mental status - affect appropriate to mood  Lymphatics - no palpable lymphadenopathy, no hepatosplenomegaly  Chest - no tachypnea, retractions or cyanosis  Heart - S1 and S2 normal  Abdomen - no rebound tenderness noted  Back exam - limited range of motion  Neurological - abnormal neurological exam unchanged from prior examinations  Musculoskeletal - osteoarthritic changes noted in both hands  Extremities - no pedal edema noted, intact peripheral pulses      Results  Results for orders placed or performed in visit on 02/05/21   AMB EXT LDL-C   Result Value Ref Range    LDL-C, External 85    AMB EXT CREATININE   Result Value Ref Range    Creatinine, External 0.5        ASSESSMENT and PLAN    ICD-10-CM ICD-9-CM    1. Arthralgia, unspecified joint  M25.50 719.40 ibuprofen (MOTRIN) 800 mg tablet   2.  Moderate persistent asthmatic bronchitis with acute exacerbation  J45.41 493.92    3. Tobacco abuse disorder  Z72.0 305.1    4. Schizoaffective disorder, unspecified type ([facility]  F25.9 295.70    5. Poor appetite  R63.0 783.0 megestroL (MEGACE) 20 mg tablet   6. Screening mammogram for breast cancer  Z12.31 V76.12 ALBER MAMMO BI SCREENING INCL CAD     lab results and schedule of future lab studies reviewed with patient  reviewed diet, exercise and weight control  cardiovascular risk and specific lipid/LDL goals reviewed  reviewed medications and side effects in detail      I have discussed the diagnosis with the patient and the intended plan of care as seen in the above orders. The patient has received an after-visit summary and questions were answered concerning future plans. I have discussed medication, side effects, and warnings with the patient in detail. The patient verbalized understanding and is in agreement with the plan of care. The patient will contact the office with any additional concerns. Rafael Pan MD    PLEASE NOTE:   This document has been produced using voice recognition software.  Unrecognized errors in transcription may be present

## 2022-04-18 ENCOUNTER — TELEPHONE (OUTPATIENT)
Dept: FAMILY MEDICINE CLINIC | Age: 62
End: 2022-04-18

## 2022-05-16 ENCOUNTER — TELEPHONE (OUTPATIENT)
Dept: FAMILY MEDICINE CLINIC | Age: 62
End: 2022-05-16

## 2022-05-16 NOTE — TELEPHONE ENCOUNTER
I called  Guerita Chase, to assist her  in scheduling a Mammogram.Unable to leave a message  for this patient to return my call , due Voice mail no set up.     Mariya Sousa LPN   Panel Manager

## 2022-06-22 ENCOUNTER — VIRTUAL VISIT (OUTPATIENT)
Dept: FAMILY MEDICINE CLINIC | Age: 62
End: 2022-06-22
Payer: MEDICAID
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
## 2022-06-22 DIAGNOSIS — R25.1 TREMORS OF NERVOUS SYSTEM: ICD-10-CM

## 2022-06-22 DIAGNOSIS — E61.1 IRON DEFICIENCY: ICD-10-CM

## 2022-06-22 DIAGNOSIS — Z13.220 LIPID SCREENING: ICD-10-CM

## 2022-06-22 DIAGNOSIS — M25.50 ARTHRALGIA, UNSPECIFIED JOINT: ICD-10-CM

## 2022-06-22 DIAGNOSIS — J45.41 MODERATE PERSISTENT ASTHMATIC BRONCHITIS WITH ACUTE EXACERBATION: ICD-10-CM

## 2022-06-22 DIAGNOSIS — F10.939 ALCOHOL WITHDRAWAL SYNDROME WITH COMPLICATION (HCC): ICD-10-CM

## 2022-06-22 DIAGNOSIS — F10.10 ALCOHOL ABUSE: ICD-10-CM

## 2022-06-22 DIAGNOSIS — F25.9 SCHIZOAFFECTIVE DISORDER, UNSPECIFIED TYPE (HCC): ICD-10-CM

## 2022-06-22 PROCEDURE — 99215 OFFICE O/P EST HI 40 MIN: CPT | Performed by: FAMILY MEDICINE

## 2022-06-22 RX ORDER — CHLORDIAZEPOXIDE HYDROCHLORIDE 10 MG/1
10 CAPSULE, GELATIN COATED ORAL
Qty: 9 CAPSULE | Refills: 0 | Status: SHIPPED | OUTPATIENT
Start: 2022-06-22 | End: 2022-06-25

## 2022-06-22 RX ORDER — DISULFIRAM 250 MG/1
250 TABLET ORAL DAILY
Qty: 14 TABLET | Refills: 0 | Status: SHIPPED | OUTPATIENT
Start: 2022-06-22

## 2022-06-22 RX ORDER — ALBUTEROL SULFATE 90 UG/1
AEROSOL, METERED RESPIRATORY (INHALATION)
Qty: 8.5 G | Refills: 1 | Status: SHIPPED | OUTPATIENT
Start: 2022-06-22

## 2022-06-22 RX ORDER — BUDESONIDE AND FORMOTEROL FUMARATE DIHYDRATE 160; 4.5 UG/1; UG/1
2 AEROSOL RESPIRATORY (INHALATION) 2 TIMES DAILY
Qty: 1 EACH | Refills: 2 | Status: SHIPPED | OUTPATIENT
Start: 2022-06-22

## 2022-06-22 RX ORDER — IBUPROFEN 800 MG/1
800 TABLET ORAL
Qty: 90 TABLET | Refills: 0 | Status: SHIPPED | OUTPATIENT
Start: 2022-06-22

## 2022-06-22 NOTE — PROGRESS NOTES
Zarina Duong is a 57 y.o. female who was seen by synchronous (real-time) audio-video technology on 6/22/2022 for Follow Up Chronic Condition (patient is having withdrawls to alcohol)        Assessment & Plan:       ICD-10-CM ICD-9-CM    1. Fall, initial encounter  [address] XXXA E888.9 CT HEAD WO CONT      REFERRAL TO NEUROLOGY   2. Tremors of nervous system  R25.1 781.0 CT HEAD WO CONT      REFERRAL TO NEUROLOGY   3. Alcohol abuse  F10.10 305.00 disulfiram (ANTABUSE) 250 mg tablet      folic acid-vit O3-JXJ S78 (FOLGARD) 0.8- mg-mg-mcg tab      FOLATE   4. Alcohol withdrawal syndrome with complication (HCC)  E15.727 291.81 chlordiazePOXIDE (LIBRIUM) 10 mg capsule      FOLATE   5. Arthralgia, unspecified joint  M25.50 719.40 ibuprofen (MOTRIN) 800 mg tablet   6. Moderate persistent asthmatic bronchitis with acute exacerbation  J45.41 493.92 budesonide-formoteroL (SYMBICORT) 160-4.5 mcg/actuation HFAA      albuterol (ProAir HFA) 90 mcg/actuation inhaler   7. Schizoaffective disorder, unspecified type ([facility]  F25.9 295.70    8. Lipid screening  Z13.220 V77.91 LIPID PANEL   9. Iron deficiency  B52.6 021.0 METABOLIC PANEL, COMPREHENSIVE      CBC WITH AUTOMATED DIFF      IRON PROFILE      FERRITIN     I spent at least 42 minutes on this visit with this established patient. Subjective:   Zarina Duong is seen for follow-up care. Multiple falls: Patient has had multiple falls. She has poor balance and incoordination. She has tremors of the nervous system. This has resulted in several falls. I will order head CT scan. I will refer to neurology for evaluation. We will follow-up in the clinic. She denies any changes in vision. She denies headache. He denies numbness or tingling. Alcohol abuse: Patient has history of alcohol abuse. She has been off the alcohol for the past 3 weeks. There is a question of suspected withdrawals. Would like some medication to help with withdrawals.   She thinks most of her shaking and tremors are due to alcohol withdrawal side effects informed. I will check labs. I will send in folic acid, multivitamin and the Librium. She would also benefit from Antabuse. Arthralgia: Patient has arthralgia with generalized joint aches. She has used ibuprofen in the past for this. She would like a refill of medication. Prescription will be sent in. COPD: Patient has COPD. Has occasional shortness of breath and wheeze. She would like inhaler medication. She denies cough or hemoptysis. Denies fever or chills. We will send in Symbicort and albuterol. I will follow-up in the clinic. Iron deficiency: Patient has a history of iron deficiency. Plan to recheck labs. Schizoaffective disorder: Patient has a history of schizoaffective disorder. She is on Risperdal for this. She will follow-up with the behavioral health specialist.      Prior to Admission medications    Medication Sig Start Date End Date Taking? Authorizing Provider   ibuprofen (MOTRIN) 800 mg tablet Take 1 Tablet by mouth every eight (8) hours as needed for Pain. 1/27/22  Yes Alexis Beltran MD   megestroL (MEGACE) 20 mg tablet Take 1 Tablet by mouth daily. 1/27/22  Yes Malu Palmer MD   budesonide-formoteroL (SYMBICORT) 160-4.5 mcg/actuation HFAA Take 2 Puffs by inhalation two (2) times a day. 9/27/21  Yes Malu Palmer MD   albuterol (ProAir HFA) 90 mcg/actuation inhaler inhale 2 puffs by mouth every 6 hours if needed for wheezing 9/27/21  Yes Malu Palmer MD   methazolAMIDE (NEPTAZANE) 50 mg tablet take 1 tablet by mouth once daily 10/30/20  Yes Provider, Historical   Combigan 0.2-0.5 % drop ophthalmic solution instill 1 drop into both eyes twice a day 10/15/20  Yes Provider, Historical   risperiDONE (RisperDAL) 4 mg tablet Take  by mouth. Yes Provider, Historical   timolol (TIMOPTIC) 0.5 % ophthalmic solution 1 Drop two (2) times a day.    Yes Provider, Historical     ROS Review of all systems is negative except as noted above in the HPI. Objective:     Patient-Reported Vitals 6/22/2022   Patient-Reported Weight 131 lbs   Constitutional: [x]  Abnormal -chronically ill looking    Mental status: [x] Alert and awake     HENT: [x] Normocephalic, atraumatic    Pulmonary/Chest: [x] Respiratory effort normal   [x] No visualized signs of difficulty breathing or respiratory distress           Neurological:        [x] No Facial Asymmetry (Cranial nerve 7 motor function) (limited exam due to video visit)                    We discussed the expected course, resolution and complications of the diagnosis(es) in detail. Medication risks, benefits, costs, interactions, and alternatives were discussed as indicated. I advised her to contact the office if her condition worsens, changes or fails to improve as anticipated. She expressed understanding with the diagnosis(es) and plan. Florinda Fernandez, was evaluated through a synchronous (real-time) audio-video encounter. The patient (or guardian if applicable) is aware that this is a billable service, which includes applicable co-pays. This Virtual Visit was conducted with patient's (and/or legal guardian's) consent. The visit was conducted pursuant to the emergency declaration under the [facility], [facility] authority and the [employer] General Act. Patient identification was verified, and a caregiver was present when appropriate. The patient was located at: Home: [address]  [address] [facility]  The provider was located at:  Facility (Appt Department): [address] Spencer Velasquez MD

## 2022-06-22 NOTE — PROGRESS NOTES
Chief Complaint   Patient presents with    Follow Up Chronic Condition     patient is having withdrawls to alcohol     1. \"Have you been to the ER, urgent care clinic since your last visit? Hospitalized since your last visit? \" No    2. \"Have you seen or consulted any other health care providers outside of the [address] [place] [address] since your last visit? \" No     3. For patients aged 39-70: Has the patient had a colonoscopy / FIT/ Cologuard? No      If the patient is female:    4. For patients aged 41-77: Has the patient had a mammogram within the past 2 years? No      5. For patients aged 21-65: Has the patient had a pap smear?  Yes - no Care Gap present

## 2022-11-28 ENCOUNTER — TELEPHONE (OUTPATIENT)
Dept: MAMMOGRAPHY | Age: 62
End: 2022-11-28